# Patient Record
Sex: FEMALE | URBAN - METROPOLITAN AREA
[De-identification: names, ages, dates, MRNs, and addresses within clinical notes are randomized per-mention and may not be internally consistent; named-entity substitution may affect disease eponyms.]

---

## 2017-07-16 ENCOUNTER — EMERGENCY (EMERGENCY)
Facility: HOSPITAL | Age: 22
LOS: 0 days | Discharge: HOME | End: 2017-07-17
Attending: EMERGENCY MEDICINE
Payer: MEDICAID

## 2017-07-16 VITALS
HEIGHT: 65 IN | WEIGHT: 160.06 LBS | DIASTOLIC BLOOD PRESSURE: 65 MMHG | SYSTOLIC BLOOD PRESSURE: 123 MMHG | OXYGEN SATURATION: 100 % | TEMPERATURE: 99 F | HEART RATE: 60 BPM

## 2017-07-16 PROCEDURE — 99282 EMERGENCY DEPT VISIT SF MDM: CPT

## 2017-07-16 NOTE — ED ADULT TRIAGE NOTE - CHIEF COMPLAINT QUOTE
labia swollen since yesterday after having sex. pt believes she may have been allergic to the condom.

## 2017-07-17 RX ORDER — CEPHALEXIN 500 MG
1 CAPSULE ORAL
Qty: 40 | Refills: 0 | OUTPATIENT
Start: 2017-07-17 | End: 2017-07-27

## 2017-07-17 NOTE — ED PROVIDER NOTE - PRESENTING SYMPTOMS DETAILS
21F no pmhx/shx cmoes in w R labia irritation after scratching it since yesterday. states she has eczema and feel like she gets pruritis around her labia occasionally. since then pt has abrasions and feels like her R labia is swollen and possibly infx, denies vag discharge/vb, no obscess felt  ROS: No fever/chills, No photophobia/eye pain/changes in vision, No ear pain/sore throat/dysphagia, No chest pain/palpitations, no SOB/cough/wheeze/stridor, No abdominal pain/N/V/D, no dysuria/frequency/discharge, No neck/back pain, no rash, no changes in neurological status/function.

## 2017-07-17 NOTE — ED PROVIDER NOTE - MEDICAL DECISION MAKING DETAILS
labia appears irritated from scratching, will tx for possible infx in the area, no abscess felt. pt appears well and non-toxic. . VSS. Sx have improved during ED stay. No acute events during ED observation period. Precautions given to return to the ED if sx persist or change. Pt expresses understanding and has no further questions. Pt feels comfortable and wishes to be discharged home. Instructed to follow up with PMD in 24 hrs. gyn

## 2017-07-17 NOTE — ED ADULT NURSE NOTE - OBJECTIVE STATEMENT
Patient co vaginal itching and a bump x1 day. States she had intercourse yesterday with condom. Denies discharge

## 2017-07-17 NOTE — ED PROVIDER NOTE - PHYSICAL EXAMINATION
GEN: Alert, NAD  HEAD: atraumatic, EOMI, PERRL, normal lids/conjunctiva  ENT: normal hearing, patent oropharynx without erythema/exudate, uvula midline  NECK: +supple, no tenderness/meningismus, +Trachea midline  PULM: Bilateral BS, normal resp effort, no wheeze/stridor/retractions  CV: RRR, no M/R/G, +dist ext pulses  ABD: soft, NT/ND  BACK: no CVAT, no midline tenderness  MSK: no edema/erythema/cyanosis  SKIN: no rash  NEURO: AAOx3, no sensory/motor deficits, CN 2-12 intact  R labia mildly swollen w excoriations from scratching, no obvious abscess seen

## 2017-07-18 DIAGNOSIS — N89.8 OTHER SPECIFIED NONINFLAMMATORY DISORDERS OF VAGINA: ICD-10-CM

## 2017-07-18 DIAGNOSIS — N76.2 ACUTE VULVITIS: ICD-10-CM

## 2017-07-18 DIAGNOSIS — L30.9 DERMATITIS, UNSPECIFIED: ICD-10-CM

## 2017-07-22 ENCOUNTER — EMERGENCY (EMERGENCY)
Facility: HOSPITAL | Age: 22
LOS: 0 days | Discharge: HOME | End: 2017-07-22
Attending: STUDENT IN AN ORGANIZED HEALTH CARE EDUCATION/TRAINING PROGRAM
Payer: MEDICAID

## 2017-07-22 VITALS
HEIGHT: 65 IN | SYSTOLIC BLOOD PRESSURE: 126 MMHG | DIASTOLIC BLOOD PRESSURE: 67 MMHG | WEIGHT: 156.09 LBS | HEART RATE: 78 BPM | RESPIRATION RATE: 18 BRPM | TEMPERATURE: 98 F | OXYGEN SATURATION: 100 %

## 2017-07-22 VITALS
RESPIRATION RATE: 18 BRPM | TEMPERATURE: 98 F | SYSTOLIC BLOOD PRESSURE: 118 MMHG | HEART RATE: 61 BPM | DIASTOLIC BLOOD PRESSURE: 68 MMHG | OXYGEN SATURATION: 100 %

## 2017-07-22 DIAGNOSIS — R10.2 PELVIC AND PERINEAL PAIN: ICD-10-CM

## 2017-07-22 DIAGNOSIS — N39.0 URINARY TRACT INFECTION, SITE NOT SPECIFIED: ICD-10-CM

## 2017-07-22 DIAGNOSIS — B37.3 CANDIDIASIS OF VULVA AND VAGINA: ICD-10-CM

## 2017-07-22 DIAGNOSIS — J45.909 UNSPECIFIED ASTHMA, UNCOMPLICATED: ICD-10-CM

## 2017-07-22 LAB
ALBUMIN SERPL ELPH-MCNC: 3.8 G/DL — SIGNIFICANT CHANGE UP (ref 3.3–5)
ALP SERPL-CCNC: 65 U/L — SIGNIFICANT CHANGE UP (ref 40–120)
ALT FLD-CCNC: 17 U/L — SIGNIFICANT CHANGE UP (ref 12–78)
ANION GAP SERPL CALC-SCNC: 8 MMOL/L — SIGNIFICANT CHANGE UP (ref 5–17)
AST SERPL-CCNC: 17 U/L — SIGNIFICANT CHANGE UP (ref 15–37)
BASOPHILS # BLD AUTO: 0 K/UL — SIGNIFICANT CHANGE UP (ref 0–0.2)
BASOPHILS NFR BLD AUTO: 0.8 % — SIGNIFICANT CHANGE UP (ref 0–2)
BILIRUB SERPL-MCNC: 0.3 MG/DL — SIGNIFICANT CHANGE UP (ref 0.2–1.2)
BUN SERPL-MCNC: 12 MG/DL — SIGNIFICANT CHANGE UP (ref 7–23)
CALCIUM SERPL-MCNC: 9.2 MG/DL — SIGNIFICANT CHANGE UP (ref 8.5–10.1)
CHLORIDE SERPL-SCNC: 110 MMOL/L — HIGH (ref 96–108)
CO2 SERPL-SCNC: 27 MMOL/L — SIGNIFICANT CHANGE UP (ref 22–31)
CREAT SERPL-MCNC: 0.51 MG/DL — SIGNIFICANT CHANGE UP (ref 0.5–1.3)
EOSINOPHIL # BLD AUTO: 0.1 K/UL — SIGNIFICANT CHANGE UP (ref 0–0.5)
EOSINOPHIL NFR BLD AUTO: 2 % — SIGNIFICANT CHANGE UP (ref 0–6)
GLUCOSE SERPL-MCNC: 83 MG/DL — SIGNIFICANT CHANGE UP (ref 70–99)
HBA1C BLD-MCNC: 5.2 % — SIGNIFICANT CHANGE UP (ref 4–5.6)
HCT VFR BLD CALC: 38.5 % — SIGNIFICANT CHANGE UP (ref 34.5–45)
HGB BLD-MCNC: 12.5 G/DL — SIGNIFICANT CHANGE UP (ref 11.5–15.5)
LYMPHOCYTES # BLD AUTO: 2 K/UL — SIGNIFICANT CHANGE UP (ref 1–3.3)
LYMPHOCYTES # BLD AUTO: 41.8 % — SIGNIFICANT CHANGE UP (ref 13–44)
MCHC RBC-ENTMCNC: 29.3 PG — SIGNIFICANT CHANGE UP (ref 27–34)
MCHC RBC-ENTMCNC: 32.6 GM/DL — SIGNIFICANT CHANGE UP (ref 32–36)
MCV RBC AUTO: 90 FL — SIGNIFICANT CHANGE UP (ref 80–100)
MONOCYTES # BLD AUTO: 0.4 K/UL — SIGNIFICANT CHANGE UP (ref 0–0.9)
MONOCYTES NFR BLD AUTO: 7.9 % — SIGNIFICANT CHANGE UP (ref 2–14)
NEUTROPHILS # BLD AUTO: 2.3 K/UL — SIGNIFICANT CHANGE UP (ref 1.8–7.4)
NEUTROPHILS NFR BLD AUTO: 47.5 % — SIGNIFICANT CHANGE UP (ref 43–77)
PLATELET # BLD AUTO: 235 K/UL — SIGNIFICANT CHANGE UP (ref 150–400)
POTASSIUM SERPL-MCNC: 4 MMOL/L — SIGNIFICANT CHANGE UP (ref 3.5–5.3)
POTASSIUM SERPL-SCNC: 4 MMOL/L — SIGNIFICANT CHANGE UP (ref 3.5–5.3)
PROT SERPL-MCNC: 8 GM/DL — SIGNIFICANT CHANGE UP (ref 6–8.3)
RBC # BLD: 4.28 M/UL — SIGNIFICANT CHANGE UP (ref 3.8–5.2)
RBC # FLD: 10.7 % — LOW (ref 11–15)
SODIUM SERPL-SCNC: 145 MMOL/L — SIGNIFICANT CHANGE UP (ref 135–145)
WBC # BLD: 4.8 K/UL — SIGNIFICANT CHANGE UP (ref 3.8–10.5)
WBC # FLD AUTO: 4.8 K/UL — SIGNIFICANT CHANGE UP (ref 3.8–10.5)

## 2017-07-22 PROCEDURE — 99284 EMERGENCY DEPT VISIT MOD MDM: CPT | Mod: 25

## 2017-07-22 RX ORDER — FLUCONAZOLE 150 MG/1
1 TABLET ORAL
Qty: 10 | Refills: 0 | OUTPATIENT
Start: 2017-07-22 | End: 2017-08-01

## 2017-07-22 RX ORDER — MORPHINE SULFATE 50 MG/1
2 CAPSULE, EXTENDED RELEASE ORAL ONCE
Qty: 0 | Refills: 0 | Status: DISCONTINUED | OUTPATIENT
Start: 2017-07-22 | End: 2017-07-22

## 2017-07-22 RX ORDER — ONDANSETRON 8 MG/1
4 TABLET, FILM COATED ORAL ONCE
Qty: 0 | Refills: 0 | Status: COMPLETED | OUTPATIENT
Start: 2017-07-22 | End: 2017-07-22

## 2017-07-22 RX ORDER — FLUCONAZOLE 150 MG/1
150 TABLET ORAL ONCE
Qty: 0 | Refills: 0 | Status: COMPLETED | OUTPATIENT
Start: 2017-07-22 | End: 2017-07-22

## 2017-07-22 RX ORDER — LIDOCAINE HCL 20 MG/ML
15 VIAL (ML) INJECTION ONCE
Qty: 0 | Refills: 0 | Status: COMPLETED | OUTPATIENT
Start: 2017-07-22 | End: 2017-07-22

## 2017-07-22 RX ORDER — AMPICILLIN SODIUM AND SULBACTAM SODIUM 250; 125 MG/ML; MG/ML
3 INJECTION, POWDER, FOR SUSPENSION INTRAMUSCULAR; INTRAVENOUS ONCE
Qty: 0 | Refills: 0 | Status: COMPLETED | OUTPATIENT
Start: 2017-07-22 | End: 2017-07-22

## 2017-07-22 RX ADMIN — Medication 15 MILLILITER(S): at 10:34

## 2017-07-22 RX ADMIN — FLUCONAZOLE 150 MILLIGRAM(S): 150 TABLET ORAL at 12:53

## 2017-07-22 RX ADMIN — MORPHINE SULFATE 2 MILLIGRAM(S): 50 CAPSULE, EXTENDED RELEASE ORAL at 09:34

## 2017-07-22 RX ADMIN — ONDANSETRON 4 MILLIGRAM(S): 8 TABLET, FILM COATED ORAL at 08:35

## 2017-07-22 RX ADMIN — MORPHINE SULFATE 2 MILLIGRAM(S): 50 CAPSULE, EXTENDED RELEASE ORAL at 08:35

## 2017-07-22 RX ADMIN — AMPICILLIN SODIUM AND SULBACTAM SODIUM 200 GRAM(S): 250; 125 INJECTION, POWDER, FOR SUSPENSION INTRAMUSCULAR; INTRAVENOUS at 12:53

## 2017-07-22 NOTE — ED ADULT TRIAGE NOTE - CHIEF COMPLAINT QUOTE
c/o frequent yeast infection, scratch labia , labia looks infected c/o frequent yeast infection, scratch labia , labia looks infected, pt is breastfeeding c/o frequent yeast infection, scratch labia , labia looks infected, pt is breastfeeding, feel urinary retention do to pain

## 2017-07-22 NOTE — ED PROVIDER NOTE - PROGRESS NOTE DETAILS
dr judd consulted, states that pt can be discharged with rose if reliable, pt can also follow up in his office to have rose removed, recommends diflucan po qd x 10 days, also wants a hga1c and hsv type 2 ordered

## 2017-07-22 NOTE — ED ADULT NURSE NOTE - OBJECTIVE STATEMENT
pt seen in ED for uti, and labia irritation on sunday, given antibiotics. pt unable to urinate x 2 days due to pain

## 2017-07-22 NOTE — ED PROVIDER NOTE - OBJECTIVE STATEMENT
21 year old female with chronic h/o vaginal candidiasis and asthma presents today c/o worsening vaginal irritation, pt was seen at [facility] on Sunday and started on keflex for her irritation, since then she did scratch the area and the area worsened, now swollen (more on the right labia) +white discharge, +severe tenderness (-) fevers (-) nausea or vomiting, pt has seen her gyn who has been treating her with multiple medications, it does improve but then worsens

## 2017-07-22 NOTE — ED PROVIDER NOTE - MEDICAL DECISION MAKING DETAILS
labs, rose placed, iv antibiotics for secondary infection and diflucan, ob consulted and recommend to start diflucan QD x10 days, also want hga1c and hsv type 2 ordered , pt can follow up in his office to have rose removed on monday

## 2017-07-23 LAB
HSV2 IGG FLD-ACNC: 3.53 INDEX — HIGH
HSV2 IGG SERPL QL IA: POSITIVE

## 2017-08-02 ENCOUNTER — EMERGENCY (EMERGENCY)
Facility: HOSPITAL | Age: 22
LOS: 0 days | Discharge: HOME | End: 2017-08-02
Attending: EMERGENCY MEDICINE
Payer: MEDICAID

## 2017-08-02 VITALS
OXYGEN SATURATION: 99 % | WEIGHT: 145.95 LBS | RESPIRATION RATE: 16 BRPM | TEMPERATURE: 99 F | SYSTOLIC BLOOD PRESSURE: 121 MMHG | DIASTOLIC BLOOD PRESSURE: 71 MMHG | HEART RATE: 60 BPM | HEIGHT: 65 IN

## 2017-08-02 LAB
APPEARANCE UR: ABNORMAL
BACTERIA # UR AUTO: ABNORMAL
BILIRUB UR-MCNC: NEGATIVE — SIGNIFICANT CHANGE UP
COLOR SPEC: YELLOW — SIGNIFICANT CHANGE UP
DIFF PNL FLD: ABNORMAL
EPI CELLS # UR: ABNORMAL
GLUCOSE UR QL: NEGATIVE MG/DL — SIGNIFICANT CHANGE UP
HCG UR QL: NEGATIVE — SIGNIFICANT CHANGE UP
KETONES UR-MCNC: NEGATIVE — SIGNIFICANT CHANGE UP
LEUKOCYTE ESTERASE UR-ACNC: ABNORMAL
NITRITE UR-MCNC: POSITIVE
PH UR: 8 — SIGNIFICANT CHANGE UP (ref 5–8)
PROT UR-MCNC: 30 MG/DL
RBC CASTS # UR COMP ASSIST: ABNORMAL /HPF (ref 0–4)
SP GR SPEC: 1.01 — SIGNIFICANT CHANGE UP (ref 1.01–1.02)
UROBILINOGEN FLD QL: NEGATIVE MG/DL — SIGNIFICANT CHANGE UP
WBC UR QL: ABNORMAL

## 2017-08-02 PROCEDURE — 99283 EMERGENCY DEPT VISIT LOW MDM: CPT

## 2017-08-02 RX ORDER — ACYCLOVIR SODIUM 500 MG
200 VIAL (EA) INTRAVENOUS ONCE
Qty: 0 | Refills: 0 | Status: DISCONTINUED | OUTPATIENT
Start: 2017-08-02 | End: 2017-08-02

## 2017-08-02 RX ORDER — ACYCLOVIR SODIUM 500 MG
400 VIAL (EA) INTRAVENOUS ONCE
Qty: 0 | Refills: 0 | Status: COMPLETED | OUTPATIENT
Start: 2017-08-02 | End: 2017-08-02

## 2017-08-02 RX ORDER — NITROFURANTOIN MACROCRYSTAL 50 MG
100 CAPSULE ORAL ONCE
Qty: 0 | Refills: 0 | Status: COMPLETED | OUTPATIENT
Start: 2017-08-02 | End: 2017-08-02

## 2017-08-02 RX ORDER — ACYCLOVIR SODIUM 500 MG
1 VIAL (EA) INTRAVENOUS
Qty: 21 | Refills: 0
Start: 2017-08-02 | End: 2017-08-09

## 2017-08-02 RX ORDER — NITROFURANTOIN MACROCRYSTAL 50 MG
1 CAPSULE ORAL
Qty: 14 | Refills: 0 | OUTPATIENT
Start: 2017-08-02 | End: 2017-08-09

## 2017-08-02 RX ADMIN — Medication 400 MILLIGRAM(S): at 19:31

## 2017-08-02 RX ADMIN — Medication 100 MILLIGRAM(S): at 19:31

## 2017-08-02 NOTE — ED ADULT NURSE NOTE - OBJECTIVE STATEMENT
received on stretcher, alert and oriented x4. Patient states that she is here to have her rose catheter removed as she missed the GYN appointment as scheduled at her doctors office.

## 2017-08-02 NOTE — ED PROVIDER NOTE - OBJECTIVE STATEMENT
22 yo female presents requesting rose removal. Patient states rose was placed two weeks ago in this facility secondary to inability to urinate. Patient states that her labia was irritated. Patient denies vaginal discharge, lesions.

## 2017-08-02 NOTE — ED PROVIDER NOTE - CARE PLAN
Principal Discharge DX:	UTI (urinary tract infection)  Secondary Diagnosis:	Balbuena catheter problem, subsequent encounter

## 2017-08-04 DIAGNOSIS — R39.198 OTHER DIFFICULTIES WITH MICTURITION: ICD-10-CM

## 2017-08-04 DIAGNOSIS — N39.0 URINARY TRACT INFECTION, SITE NOT SPECIFIED: ICD-10-CM

## 2017-08-04 DIAGNOSIS — T83.9XXD UNSPECIFIED COMPLICATION OF GENITOURINARY PROSTHETIC DEVICE, IMPLANT AND GRAFT, SUBSEQUENT ENCOUNTER: ICD-10-CM
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
## 2017-08-07 RX ORDER — AZTREONAM 2 G
1 VIAL (EA) INJECTION
Qty: 14 | Refills: 0 | OUTPATIENT
Start: 2017-08-07 | End: 2017-08-14

## 2018-02-28 ENCOUNTER — OUTPATIENT (OUTPATIENT)
Dept: OUTPATIENT SERVICES | Facility: HOSPITAL | Age: 23
LOS: 1 days | End: 2018-02-28

## 2018-02-28 DIAGNOSIS — O26.90 PREGNANCY RELATED CONDITIONS, UNSPECIFIED, UNSPECIFIED TRIMESTER: ICD-10-CM

## 2018-02-28 DIAGNOSIS — Z3A.00 WEEKS OF GESTATION OF PREGNANCY NOT SPECIFIED: ICD-10-CM

## 2018-03-01 ENCOUNTER — LABORATORY RESULT (OUTPATIENT)
Age: 23
End: 2018-03-01

## 2018-03-01 ENCOUNTER — OUTPATIENT (OUTPATIENT)
Dept: OUTPATIENT SERVICES | Facility: HOSPITAL | Age: 23
LOS: 1 days | End: 2018-03-01

## 2018-03-01 ENCOUNTER — APPOINTMENT (OUTPATIENT)
Dept: OBGYN | Facility: HOSPITAL | Age: 23
End: 2018-03-01
Payer: MEDICAID

## 2018-03-01 VITALS
BODY MASS INDEX: 26.66 KG/M2 | SYSTOLIC BLOOD PRESSURE: 106 MMHG | HEIGHT: 65 IN | DIASTOLIC BLOOD PRESSURE: 62 MMHG | WEIGHT: 160 LBS | HEART RATE: 79 BPM

## 2018-03-01 DIAGNOSIS — Z78.9 OTHER SPECIFIED HEALTH STATUS: ICD-10-CM

## 2018-03-01 LAB
AMPHET UR-MCNC: NEGATIVE — SIGNIFICANT CHANGE UP
BARBITURATES UR SCN-MCNC: NEGATIVE — SIGNIFICANT CHANGE UP
BASOPHILS # BLD AUTO: 0.03 K/UL — SIGNIFICANT CHANGE UP (ref 0–0.2)
BASOPHILS NFR BLD AUTO: 0.4 % — SIGNIFICANT CHANGE UP (ref 0–2)
BENZODIAZ UR-MCNC: NEGATIVE — SIGNIFICANT CHANGE UP
BLD GP AB SCN SERPL QL: NEGATIVE — SIGNIFICANT CHANGE UP
CANNABINOIDS UR-MCNC: NEGATIVE — SIGNIFICANT CHANGE UP
COCAINE METAB.OTHER UR-MCNC: NEGATIVE — SIGNIFICANT CHANGE UP
EOSINOPHIL # BLD AUTO: 0.29 K/UL — SIGNIFICANT CHANGE UP (ref 0–0.5)
EOSINOPHIL NFR BLD AUTO: 4 % — SIGNIFICANT CHANGE UP (ref 0–6)
HCT VFR BLD CALC: 35.5 % — SIGNIFICANT CHANGE UP (ref 34.5–45)
HGB BLD-MCNC: 11.9 G/DL — SIGNIFICANT CHANGE UP (ref 11.5–15.5)
IMM GRANULOCYTES # BLD AUTO: 0.04 # — SIGNIFICANT CHANGE UP
IMM GRANULOCYTES NFR BLD AUTO: 0.6 % — SIGNIFICANT CHANGE UP (ref 0–1.5)
LYMPHOCYTES # BLD AUTO: 2.5 K/UL — SIGNIFICANT CHANGE UP (ref 1–3.3)
LYMPHOCYTES # BLD AUTO: 34.9 % — SIGNIFICANT CHANGE UP (ref 13–44)
MCHC RBC-ENTMCNC: 30 PG — SIGNIFICANT CHANGE UP (ref 27–34)
MCHC RBC-ENTMCNC: 33.5 % — SIGNIFICANT CHANGE UP (ref 32–36)
MCV RBC AUTO: 89.4 FL — SIGNIFICANT CHANGE UP (ref 80–100)
METHADONE UR-MCNC: NEGATIVE — SIGNIFICANT CHANGE UP
MONOCYTES # BLD AUTO: 0.67 K/UL — SIGNIFICANT CHANGE UP (ref 0–0.9)
MONOCYTES NFR BLD AUTO: 9.3 % — SIGNIFICANT CHANGE UP (ref 2–14)
NEUTROPHILS # BLD AUTO: 3.64 K/UL — SIGNIFICANT CHANGE UP (ref 1.8–7.4)
NEUTROPHILS NFR BLD AUTO: 50.8 % — SIGNIFICANT CHANGE UP (ref 43–77)
NRBC # FLD: 0 — SIGNIFICANT CHANGE UP
OPIATES UR-MCNC: NEGATIVE — SIGNIFICANT CHANGE UP
OXYCODONE UR-MCNC: NEGATIVE — SIGNIFICANT CHANGE UP
PCP UR-MCNC: NEGATIVE — SIGNIFICANT CHANGE UP
PLATELET # BLD AUTO: 196 K/UL — SIGNIFICANT CHANGE UP (ref 150–400)
PMV BLD: 11.4 FL — SIGNIFICANT CHANGE UP (ref 7–13)
RBC # BLD: 3.97 M/UL — SIGNIFICANT CHANGE UP (ref 3.8–5.2)
RBC # FLD: 12.4 % — SIGNIFICANT CHANGE UP (ref 10.3–14.5)
RH IG SCN BLD-IMP: POSITIVE — SIGNIFICANT CHANGE UP
WBC # BLD: 7.17 K/UL — SIGNIFICANT CHANGE UP (ref 3.8–10.5)
WBC # FLD AUTO: 7.17 K/UL — SIGNIFICANT CHANGE UP (ref 3.8–10.5)

## 2018-03-01 PROCEDURE — 99213 OFFICE O/P EST LOW 20 MIN: CPT

## 2018-03-02 PROBLEM — Z78.9 DENIES ALCOHOL CONSUMPTION: Status: ACTIVE | Noted: 2018-03-02

## 2018-03-02 LAB
C TRACH RRNA SPEC QL NAA+PROBE: SIGNIFICANT CHANGE UP
HBV SURFACE AG SER-ACNC: NONREACTIVE — SIGNIFICANT CHANGE UP
HCV AB S/CO SERPL IA: 0.12 S/CO — SIGNIFICANT CHANGE UP
HCV AB SERPL-IMP: SIGNIFICANT CHANGE UP
HIV 1+2 AB+HIV1 P24 AG SERPL QL IA: SIGNIFICANT CHANGE UP
N GONORRHOEA RRNA SPEC QL NAA+PROBE: SIGNIFICANT CHANGE UP
RBC # BLD FETUS AUTO: 0.1 — SIGNIFICANT CHANGE UP
SPECIMEN SOURCE: SIGNIFICANT CHANGE UP
T GONDII IGG SER QL: <3 IU/ML — SIGNIFICANT CHANGE UP
T GONDII IGG SER QL: NEGATIVE — SIGNIFICANT CHANGE UP
T GONDII IGM SER QL: <3 AU/ML — SIGNIFICANT CHANGE UP
T GONDII IGM SER QL: NEGATIVE — SIGNIFICANT CHANGE UP
T PALLIDUM AB TITR SER: NEGATIVE — SIGNIFICANT CHANGE UP

## 2018-03-02 RX ORDER — VITAMIN C, CALCIUM, IRON, VITAMIN D3, VITAMIN E, VITAMIN B1, VITAMIN B2, VITAMIN B3, VITAMIN B6, FOLIC ACID, IODINE, ZINC, COPPER, DOCUSATE SODIUM, DOCOSAHEXAENOIC ACID (DHA) 27-1-50 MG
KIT ORAL
Refills: 0 | Status: ACTIVE | COMMUNITY

## 2018-03-02 RX ORDER — DOXYCYCLINE HYCLATE 100 MG/1
100 TABLET ORAL
Qty: 10 | Refills: 0 | Status: ACTIVE | COMMUNITY
Start: 2018-03-02 | End: 1900-01-01

## 2018-03-04 ENCOUNTER — APPOINTMENT (OUTPATIENT)
Dept: ANTEPARTUM | Facility: HOSPITAL | Age: 23
End: 2018-03-04

## 2018-03-04 ENCOUNTER — ASOB RESULT (OUTPATIENT)
Age: 23
End: 2018-03-04

## 2018-03-04 ENCOUNTER — OUTPATIENT (OUTPATIENT)
Dept: OUTPATIENT SERVICES | Facility: HOSPITAL | Age: 23
LOS: 1 days | End: 2018-03-04

## 2018-03-04 DIAGNOSIS — O26.899 OTHER SPECIFIED PREGNANCY RELATED CONDITIONS, UNSPECIFIED TRIMESTER: ICD-10-CM

## 2018-03-04 DIAGNOSIS — Z3A.00 WEEKS OF GESTATION OF PREGNANCY NOT SPECIFIED: ICD-10-CM

## 2018-03-05 ENCOUNTER — RESULT REVIEW (OUTPATIENT)
Age: 23
End: 2018-03-05

## 2018-03-05 ENCOUNTER — APPOINTMENT (OUTPATIENT)
Dept: ANTEPARTUM | Facility: CLINIC | Age: 23
End: 2018-03-05
Payer: MEDICAID

## 2018-03-05 ENCOUNTER — OUTPATIENT (OUTPATIENT)
Dept: OUTPATIENT SERVICES | Facility: HOSPITAL | Age: 23
LOS: 1 days | End: 2018-03-05

## 2018-03-05 ENCOUNTER — ASOB RESULT (OUTPATIENT)
Age: 23
End: 2018-03-05

## 2018-03-05 PROCEDURE — 76815 OB US LIMITED FETUS(S): CPT | Mod: 26

## 2018-03-05 PROCEDURE — 59200 INSERT CERVICAL DILATOR: CPT

## 2018-03-05 RX ORDER — MISOPROSTOL 200 UG/1
200 TABLET ORAL
Qty: 2 | Refills: 0 | Status: ACTIVE | COMMUNITY
Start: 2018-03-05 | End: 1900-01-01

## 2018-03-06 ENCOUNTER — INPATIENT (INPATIENT)
Facility: HOSPITAL | Age: 23
LOS: 2 days | Discharge: HOME | End: 2018-03-09
Attending: OBSTETRICS & GYNECOLOGY | Admitting: OBSTETRICS & GYNECOLOGY

## 2018-03-06 ENCOUNTER — TRANSCRIPTION ENCOUNTER (OUTPATIENT)
Age: 23
End: 2018-03-06

## 2018-03-06 ENCOUNTER — EMERGENCY (EMERGENCY)
Facility: HOSPITAL | Age: 23
LOS: 1 days | Discharge: STILL ADMITTED OR ADMINISTRATIVE | End: 2018-03-06
Admitting: EMERGENCY MEDICINE

## 2018-03-06 VITALS
TEMPERATURE: 98 F | SYSTOLIC BLOOD PRESSURE: 120 MMHG | RESPIRATION RATE: 16 BRPM | OXYGEN SATURATION: 100 % | DIASTOLIC BLOOD PRESSURE: 84 MMHG | HEART RATE: 95 BPM

## 2018-03-06 VITALS — HEART RATE: 75 BPM | TEMPERATURE: 99 F | DIASTOLIC BLOOD PRESSURE: 55 MMHG | SYSTOLIC BLOOD PRESSURE: 110 MMHG

## 2018-03-06 VITALS — HEART RATE: 65 BPM | TEMPERATURE: 97 F

## 2018-03-06 DIAGNOSIS — O26.899 OTHER SPECIFIED PREGNANCY RELATED CONDITIONS, UNSPECIFIED TRIMESTER: ICD-10-CM

## 2018-03-06 DIAGNOSIS — Z3A.00 WEEKS OF GESTATION OF PREGNANCY NOT SPECIFIED: ICD-10-CM

## 2018-03-06 DIAGNOSIS — O36.4XX0 MATERNAL CARE FOR INTRAUTERINE DEATH, NOT APPLICABLE OR UNSPECIFIED: ICD-10-CM

## 2018-03-06 LAB
APTT BLD: 28.9 SEC — SIGNIFICANT CHANGE UP (ref 27.5–37.4)
BASOPHILS # BLD AUTO: 0.03 K/UL — SIGNIFICANT CHANGE UP (ref 0–0.2)
BASOPHILS NFR BLD AUTO: 0.3 % — SIGNIFICANT CHANGE UP (ref 0–2)
BLD GP AB SCN SERPL QL: NEGATIVE — SIGNIFICANT CHANGE UP
EOSINOPHIL # BLD AUTO: 0.34 K/UL — SIGNIFICANT CHANGE UP (ref 0–0.5)
EOSINOPHIL NFR BLD AUTO: 3.9 % — SIGNIFICANT CHANGE UP (ref 0–6)
FIBRINOGEN PPP-MCNC: 439.2 MG/DL — SIGNIFICANT CHANGE UP (ref 310–510)
HCT VFR BLD CALC: 35.5 % — SIGNIFICANT CHANGE UP (ref 34.5–45)
HGB BLD-MCNC: 12 G/DL — SIGNIFICANT CHANGE UP (ref 11.5–15.5)
IMM GRANULOCYTES # BLD AUTO: 0.04 # — SIGNIFICANT CHANGE UP
IMM GRANULOCYTES NFR BLD AUTO: 0.5 % — SIGNIFICANT CHANGE UP (ref 0–1.5)
INR BLD: 0.97 — SIGNIFICANT CHANGE UP (ref 0.88–1.17)
LYMPHOCYTES # BLD AUTO: 3.31 K/UL — HIGH (ref 1–3.3)
LYMPHOCYTES # BLD AUTO: 38.4 % — SIGNIFICANT CHANGE UP (ref 13–44)
MCHC RBC-ENTMCNC: 29.8 PG — SIGNIFICANT CHANGE UP (ref 27–34)
MCHC RBC-ENTMCNC: 33.8 % — SIGNIFICANT CHANGE UP (ref 32–36)
MCV RBC AUTO: 88.1 FL — SIGNIFICANT CHANGE UP (ref 80–100)
MONOCYTES # BLD AUTO: 0.72 K/UL — SIGNIFICANT CHANGE UP (ref 0–0.9)
MONOCYTES NFR BLD AUTO: 8.4 % — SIGNIFICANT CHANGE UP (ref 2–14)
NEUTROPHILS # BLD AUTO: 4.18 K/UL — SIGNIFICANT CHANGE UP (ref 1.8–7.4)
NEUTROPHILS NFR BLD AUTO: 48.5 % — SIGNIFICANT CHANGE UP (ref 43–77)
NRBC # FLD: 0 — SIGNIFICANT CHANGE UP
PLATELET # BLD AUTO: 173 K/UL — SIGNIFICANT CHANGE UP (ref 150–400)
PMV BLD: 10.9 FL — SIGNIFICANT CHANGE UP (ref 7–13)
PROTHROM AB SERPL-ACNC: 11.2 SEC — SIGNIFICANT CHANGE UP (ref 9.8–13.1)
RBC # BLD: 4.03 M/UL — SIGNIFICANT CHANGE UP (ref 3.8–5.2)
RBC # FLD: 12.2 % — SIGNIFICANT CHANGE UP (ref 10.3–14.5)
RH IG SCN BLD-IMP: POSITIVE — SIGNIFICANT CHANGE UP
WBC # BLD: 8.62 K/UL — SIGNIFICANT CHANGE UP (ref 3.8–10.5)
WBC # FLD AUTO: 8.62 K/UL — SIGNIFICANT CHANGE UP (ref 3.8–10.5)

## 2018-03-06 RX ORDER — SODIUM CHLORIDE 9 MG/ML
1000 INJECTION, SOLUTION INTRAVENOUS
Qty: 0 | Refills: 0 | Status: DISCONTINUED | OUTPATIENT
Start: 2018-03-06 | End: 2018-03-06

## 2018-03-06 RX ORDER — KETOROLAC TROMETHAMINE 30 MG/ML
30 SYRINGE (ML) INJECTION ONCE
Qty: 0 | Refills: 0 | Status: DISCONTINUED | OUTPATIENT
Start: 2018-03-06 | End: 2018-03-06

## 2018-03-06 RX ORDER — OXYTOCIN 10 UNIT/ML
10 VIAL (ML) INJECTION ONCE
Qty: 0 | Refills: 0 | Status: COMPLETED | OUTPATIENT
Start: 2018-03-06 | End: 2018-03-06

## 2018-03-06 RX ORDER — ACETAMINOPHEN 500 MG
975 TABLET ORAL EVERY 6 HOURS
Qty: 0 | Refills: 0 | Status: DISCONTINUED | OUTPATIENT
Start: 2018-03-06 | End: 2018-03-06

## 2018-03-06 RX ORDER — MORPHINE SULFATE 50 MG/1
4 CAPSULE, EXTENDED RELEASE ORAL EVERY 6 HOURS
Qty: 0 | Refills: 0 | Status: DISCONTINUED | OUTPATIENT
Start: 2018-03-06 | End: 2018-03-06

## 2018-03-06 RX ORDER — CARBOPROST TROMETHAMINE 250 UG/ML
250 INJECTION, SOLUTION INTRAMUSCULAR ONCE
Qty: 0 | Refills: 0 | Status: DISCONTINUED | OUTPATIENT
Start: 2018-03-06 | End: 2018-03-06

## 2018-03-06 RX ORDER — INFLUENZA VIRUS VACCINE 15; 15; 15; 15 UG/.5ML; UG/.5ML; UG/.5ML; UG/.5ML
0.5 SUSPENSION INTRAMUSCULAR ONCE
Qty: 0 | Refills: 0 | Status: DISCONTINUED | OUTPATIENT
Start: 2018-03-06 | End: 2018-03-06

## 2018-03-06 RX ORDER — OXYCODONE HYDROCHLORIDE 5 MG/1
5 TABLET ORAL
Qty: 0 | Refills: 0 | Status: DISCONTINUED | OUTPATIENT
Start: 2018-03-06 | End: 2018-03-06

## 2018-03-06 RX ORDER — DIPHENOXYLATE HCL/ATROPINE 2.5-.025MG
2 TABLET ORAL ONCE
Qty: 0 | Refills: 0 | Status: DISCONTINUED | OUTPATIENT
Start: 2018-03-06 | End: 2018-03-06

## 2018-03-06 RX ORDER — OXYCODONE HYDROCHLORIDE 5 MG/1
5 TABLET ORAL EVERY 4 HOURS
Qty: 0 | Refills: 0 | Status: DISCONTINUED | OUTPATIENT
Start: 2018-03-06 | End: 2018-03-06

## 2018-03-06 RX ORDER — IBUPROFEN 200 MG
600 TABLET ORAL EVERY 6 HOURS
Qty: 0 | Refills: 0 | Status: DISCONTINUED | OUTPATIENT
Start: 2018-03-06 | End: 2018-03-06

## 2018-03-06 RX ADMIN — MORPHINE SULFATE 4 MILLIGRAM(S): 50 CAPSULE, EXTENDED RELEASE ORAL at 02:15

## 2018-03-06 RX ADMIN — Medication 0.2 MILLIGRAM(S): at 09:36

## 2018-03-06 RX ADMIN — MORPHINE SULFATE 4 MILLIGRAM(S): 50 CAPSULE, EXTENDED RELEASE ORAL at 02:30

## 2018-03-06 RX ADMIN — Medication 30 MILLIGRAM(S): at 04:25

## 2018-03-06 RX ADMIN — Medication 30 MILLIGRAM(S): at 04:10

## 2018-03-06 RX ADMIN — Medication 10 UNIT(S): at 03:08

## 2018-03-06 NOTE — DISCHARGE NOTE OB - HOSPITAL COURSE
22yoF  at 22w1d with known IUFD,  spontaneous delivery of fetus and placenta. 22yoF  at 22w1d with known IUFD,  spontaneous delivery of fetus and placenta.  EBL=500.  Additional PPH of 250.  IM methergine given, Pt to be on po methergine every 4 hours for 5 additional doses.

## 2018-03-06 NOTE — DISCHARGE NOTE OB - PLAN OF CARE
recovery After discharge, please stay on pelvic rest for 6 weeks, meaning no sexual intercourse, no tampons and no douching.  No driving for 2 weeks as women can loose a lot of blood during delivery and there is a possibility of being lightheaded/fainting.  Expect to have vaginal bleeding/spotting for up to six weeks.  The bleeding should get lighter and more white/light brown with time.  For bleeding soaking more than a pad an hour or passing clots greater than the size of your fist, come in to the emergency department.

## 2018-03-06 NOTE — CHART NOTE - NSCHARTNOTEFT_GEN_A_CORE
Pt seen for passage of clots. Pt reports no symptoms at this time, no pain.    VS  T(C): 36.7 (03-06-18 @ 07:28)  HR: 67 (03-06-18 @ 07:28)  BP: 111/57 (03-06-18 @ 07:28)  RR: 16 (03-06-18 @ 01:32)  SpO2: 100% (03-06-18 @ 01:32)    Abd: Soft, non-distended, fundus firm  VE: Minimal clots expressed from vaginal vault, cervix 1 cm dilated; roughly 200 mL of blood in hat with membranes present and small clots. No visible placenta tissue.  Abd sono: 5 mm endometrial stripe through most of the uterus, but 2 cm present right at the lower uterine segment    Plan:  - Hemabate 250 mcg IM to express remaining tissue or clot  - continue to monitor    d/w Dr. Shawna Marin, PGY-2

## 2018-03-06 NOTE — DISCHARGE NOTE OB - MEDICATION SUMMARY - MEDICATIONS TO STOP TAKING
I will STOP taking the medications listed below when I get home from the hospital:  None I will STOP taking the medications listed below when I get home from the hospital:    amoxicillin-clavulanate 250 mg-125 mg oral tablet  -- 1 tab(s) by mouth 2 times a day    Keflex 500 mg oral capsule  -- 1 cap(s) by mouth 4 times a day  -- Finish all this medication unless otherwise directed by prescriber.    Diflucan 150 mg oral tablet  -- 1 tab(s) by mouth once a day  -- Do not take this drug if you are pregnant.  Finish all this medication unless otherwise directed by prescriber.    Macrodantin 100 mg oral capsule  -- 1 cap(s) by mouth 2 times a day  -- Finish all this medication unless otherwise directed by prescriber.  May discolor urine or feces.  Take with food or milk.    Bactrim  mg-160 mg oral tablet  -- 1 tab(s) by mouth 2 times a day  -- Avoid prolonged or excessive exposure to direct and/or artificial sunlight while taking this medication.  Finish all this medication unless otherwise directed by prescriber.  Medication should be taken with plenty of water.

## 2018-03-06 NOTE — DISCHARGE NOTE OB - CARE PLAN
Principal Discharge DX:	Fetal demise due to miscarriage  Goal:	recovery  Assessment and plan of treatment:	After discharge, please stay on pelvic rest for 6 weeks, meaning no sexual intercourse, no tampons and no douching.  No driving for 2 weeks as women can loose a lot of blood during delivery and there is a possibility of being lightheaded/fainting.  Expect to have vaginal bleeding/spotting for up to six weeks.  The bleeding should get lighter and more white/light brown with time.  For bleeding soaking more than a pad an hour or passing clots greater than the size of your fist, come in to the emergency department.

## 2018-03-06 NOTE — DISCHARGE NOTE OB - PATIENT PORTAL LINK FT
You can access the [facility] Patient Portal, offered by [facility], by registering with the following website: [email or web address]

## 2018-03-06 NOTE — DISCHARGE NOTE OB - MEDICATION SUMMARY - MEDICATIONS TO TAKE
I will START or STAY ON the medications listed below when I get home from the hospital:    ibuprofen 600 mg oral tablet  -- 1 tab(s) by mouth every 6 hours  -- Do not take this drug if you are pregnant.  It is very important that you take or use this exactly as directed.  Do not skip doses or discontinue unless directed by your doctor.  May cause drowsiness or dizziness.  Obtain medical advice before taking any non-prescription drugs as some may affect the action of this medication.  Take with food or milk.    -- Indication: For pain    acyclovir 400 mg oral tablet  -- 1 tab(s) by mouth 3 times a day  -- Finish all this medication unless otherwise directed by prescriber.  It is very important that you take or use this exactly as directed.  Do not skip doses or discontinue unless directed by your doctor.    -- Indication: For home    Ventolin CFC free 90 mcg/inh inhalation aerosol  -- 2 puff(s) inhaled 4 times a day  -- Indication: For home I will START or STAY ON the medications listed below when I get home from the hospital:    ibuprofen 600 mg oral tablet  -- 1 tab(s) by mouth every 6 hours  -- Do not take this drug if you are pregnant.  It is very important that you take or use this exactly as directed.  Do not skip doses or discontinue unless directed by your doctor.  May cause drowsiness or dizziness.  Obtain medical advice before taking any non-prescription drugs as some may affect the action of this medication.  Take with food or milk.    -- Indication: For pain    acyclovir 400 mg oral tablet  -- 1 tab(s) by mouth 3 times a day  -- Finish all this medication unless otherwise directed by prescriber.  It is very important that you take or use this exactly as directed.  Do not skip doses or discontinue unless directed by your doctor.    -- Indication: For home    Ventolin CFC free 90 mcg/inh inhalation aerosol  -- 2 puff(s) inhaled 4 times a day  -- Indication: For home    Methergine 0.2 mg oral tablet  -- 1 tab(s) by mouth every 4 hours x 1 days   -- It is very important that you take or use this exactly as directed.  Do not skip doses or discontinue unless directed by your doctor.    -- Indication: For bleeding

## 2018-03-06 NOTE — ED ADULT TRIAGE NOTE - CHIEF COMPLAINT QUOTE
pt brought in by ems, pt 22 weeks pregnant, diagnosed with miscarraige last week. scheduled for fetus removal today, c.o contractions every 3 minutes. denies vaginal bleeding. pt brought directly to labor and delivery.

## 2018-03-06 NOTE — DISCHARGE NOTE OB - CARE PROVIDER_API CALL
OBGYN clinic,   [facility], 3rd floor oncology building/ambulatory clinic  Phone: [phone]  Fax: (       -

## 2018-03-06 NOTE — DISCHARGE NOTE OB - PROVIDER TOKENS
FREE:[LAST:[OBGYN clinic],PHONE:[phone]],FAX:[(   )    -],ADDRESS:[[facility], 3rd floor oncology building/ambulatory clinic]]

## 2018-03-08 DIAGNOSIS — O36.4XX0 MATERNAL CARE FOR INTRAUTERINE DEATH, NOT APPLICABLE OR UNSPECIFIED: ICD-10-CM

## 2018-03-08 DIAGNOSIS — Z3A.22 22 WEEKS GESTATION OF PREGNANCY: ICD-10-CM

## 2018-03-09 DIAGNOSIS — R52 PAIN, UNSPECIFIED: ICD-10-CM

## 2018-04-03 LAB — SURGICAL PATHOLOGY STUDY: SIGNIFICANT CHANGE UP

## 2018-04-07 NOTE — ED ADULT NURSE NOTE - VOIDING
Patient Seen in: [facility] Immediate Care In [address] [place] [address]    History   Patient presents with:  Shoulder Pain    Stated Complaint: Lt Shoulder Pain    HPI    Patient complains of left shoulder pain which he has had for several days.   He denies any his arm elevation and abduction there is no lymphangitis noted  Lungs are clear to auscultation  Cardiac there are normal first and second heart sounds  Back is nontender to palpation  Skin there is no rash present    ICC  Course      Xr Shoulder, Complete (mi cervical spine. 2. Minimal C4-C5 and C5-C6 spondylosis.      Dictated by (CST): Cameron Taylor MD on 4/07/2018 at 12:39     Approved by (CST): Cameron Taylor MD on 4/07/2018 at 12:40          MDM       Etiology  of patient's symptoms are uncertain advised burning with urination/urgency with voiding

## 2018-04-20 ENCOUNTER — OUTPATIENT (OUTPATIENT)
Dept: OUTPATIENT SERVICES | Facility: HOSPITAL | Age: 23
LOS: 1 days | End: 2018-04-20

## 2018-04-20 ENCOUNTER — APPOINTMENT (OUTPATIENT)
Dept: OBGYN | Facility: HOSPITAL | Age: 23
End: 2018-04-20

## 2018-04-20 VITALS — SYSTOLIC BLOOD PRESSURE: 121 MMHG | HEART RATE: 76 BPM | WEIGHT: 164.19 LBS | DIASTOLIC BLOOD PRESSURE: 64 MMHG

## 2018-04-20 DIAGNOSIS — Z30.9 ENCOUNTER FOR CONTRACEPTIVE MANAGEMENT, UNSPECIFIED: ICD-10-CM

## 2018-04-20 DIAGNOSIS — Z33.2 ENCOUNTER FOR ELECTIVE TERMINATION OF PREGNANCY: ICD-10-CM

## 2018-04-20 DIAGNOSIS — Z87.59 PERSONAL HISTORY OF OTHER COMPLICATIONS OF PREGNANCY, CHILDBIRTH AND THE PUERPERIUM: ICD-10-CM

## 2018-04-20 RX ORDER — LEVONORGESTREL 1.5 MG/1
1.5 TABLET ORAL
Qty: 1 | Refills: 0 | Status: ACTIVE | COMMUNITY
Start: 2018-04-20

## 2018-04-20 RX ORDER — NORETHINDRONE 0.35 MG/1
0.35 TABLET ORAL DAILY
Qty: 30 | Refills: 2 | Status: ACTIVE | COMMUNITY
Start: 2018-04-20 | End: 1900-01-01

## 2018-09-27 ENCOUNTER — EMERGENCY (EMERGENCY)
Facility: HOSPITAL | Age: 23
LOS: 1 days | Discharge: HOME | End: 2018-09-27
Attending: EMERGENCY MEDICINE | Admitting: EMERGENCY MEDICINE
Payer: MEDICAID

## 2018-09-27 VITALS
TEMPERATURE: 98 F | HEART RATE: 72 BPM | DIASTOLIC BLOOD PRESSURE: 63 MMHG | RESPIRATION RATE: 18 BRPM | OXYGEN SATURATION: 100 % | SYSTOLIC BLOOD PRESSURE: 118 MMHG

## 2018-09-27 PROCEDURE — 99282 EMERGENCY DEPT VISIT SF MDM: CPT

## 2018-09-27 NOTE — ED PROVIDER NOTE - NEURO NEGATIVE STATEMENT, MLM
no loss of consciousness, no gait abnormality, no headache, no sensory deficits, and no weakness.
None

## 2018-09-27 NOTE — ED ADULT NURSE NOTE - OBJECTIVE STATEMENT
BIB ems for reports of feeling depressed since the murder of her bf/child's father on Tuesday. Pt is awake, a&ox3, calm. Denies s/i h/i or a/v/h. Pt has been able to take care of herself and her infant baby, including breastfeeding. Denies drug/etoh use.

## 2018-09-27 NOTE — ED BEHAVIORAL HEALTH NOTE - BEHAVIORAL HEALTH NOTE
Writer met with pt, pt's mother, Yudy Palomo #[phone], and pt's infant son in low acuity area. Pt provided permission for writer to speak in front of mother. Pt was also breast feeding son at this time. Pt also BIB EMS from home for depressed mood following death of boyfriend x3 days ago. Pt currently denies SI and HI. Writer offered support to pt and mother. Writer also provided pt with resources for [facility] outpatient clinic, [facility] walk in clinics, and medicaid transportation form 2015. Writer also explained medicaid transportation request form 2015 to pt and her mother. Pt and mother also verbalized understanding of information offered. No additional questions or concerns were reported.

## 2018-09-27 NOTE — ED PROVIDER NOTE - OBJECTIVE STATEMENT
22 yo female with no h/o psychiatric illness brought in by her mom because she has been depressed over the last 3 days. Pt's boyfriend was murdered 3 days ago which triggered her feelings of sadness. Pt endorses that she has been crying and having decreased appetite  since the event. She denies suicidal ideations, homicidal ideations and audio/visual hallucinations. Pt currently living with mom and her child.

## 2018-09-27 NOTE — ED ADULT NURSE NOTE - NSIMPLEMENTINTERV_GEN_ALL_ED
Implemented All Universal Safety Interventions:  [place] to call system. Call bell, personal items and telephone within reach. Instruct patient to call for assistance. Room bathroom lighting operational. Non-slip footwear when patient is off stretcher. Physically safe environment: no spills, clutter or unnecessary equipment. Stretcher in lowest position, wheels locked, appropriate side rails in place.

## 2018-09-27 NOTE — ED ADULT TRIAGE NOTE - CHIEF COMPLAINT QUOTE
Pt arrives via EMS from home accompanied by her mother and infant son. Pt states her boyfriend was murdered 3 days ago and since that time she has been severely depressed, crying all day and been experiencing loss of appetite.  Pt denies SI/HI; denies alcohol or drug use; also denies medical complaints.  EMS: FS=85 en route to ER.

## 2019-01-07 NOTE — ED ADULT NURSE NOTE - NS ED NURSE DC TEACHING
Final Anesthesia Post-op Assessment    Patient: Nelson Beck  Procedure(s) Performed: RIGHT EYE PHACOEMULSIFICATION OF CATARACT WITH INSERTION OF POSTERIOR CHAMBER IMPLANT  Anesthesia type: Monitor Anesthesia Care    Vital Last Value   Temperature 36.1 Â°C (96.9 Â°F) (01/07/19 0859)   Pulse 58 (01/07/19 1057)   Respiratory Rate 16 (01/07/19 1057)   Non-Invasive   Blood Pressure 113/55 (01/07/19 1057)   Arterial  Blood Pressure     Pulse Oximetry 98 % (01/07/19 1057)     Last 24 I/O:     Intake/Output Summary (Last 24 hours) at 1/7/2019 1100  Last data filed at 1/7/2019 1057  Gross per 24 hour   Intake 300 ml   Output --   Net 300 ml       POST-OP VITAL SIGNS: stable  LEVEL OF CONSCIOUSNESS: participates in exam, awake, alert and answers questions appropriately  RESPIRATORY STATUS: unassisted, spontaneous ventilation and room air  CARDIOVASCULAR: stable and blood pressure returned to baseline  HYDRATION: euvolemic    PAIN MANAGEMENT: well controlled  NAUSEA: None  AIRWAY PATENCY: patent  POST-OP ASSESSMENT: no complications, patient tolerated procedure well with no complications and sufficiently recovered from acute administration of anesthesia effects and able to participate in evaluation  COMPLICATIONS: none
OB/GYN

## 2019-05-15 ENCOUNTER — EMERGENCY (EMERGENCY)
Facility: HOSPITAL | Age: 24
LOS: 1 days | Discharge: HOME | End: 2019-05-15
Attending: EMERGENCY MEDICINE | Admitting: EMERGENCY MEDICINE
Payer: MEDICAID

## 2019-05-15 VITALS
DIASTOLIC BLOOD PRESSURE: 79 MMHG | OXYGEN SATURATION: 100 % | SYSTOLIC BLOOD PRESSURE: 136 MMHG | TEMPERATURE: 99 F | HEART RATE: 97 BPM | RESPIRATION RATE: 18 BRPM

## 2019-05-15 PROCEDURE — 71046 X-RAY EXAM CHEST 2 VIEWS: CPT | Mod: 26

## 2019-05-15 PROCEDURE — 99285 EMERGENCY DEPT VISIT HI MDM: CPT | Mod: 25

## 2019-05-15 RX ORDER — IPRATROPIUM/ALBUTEROL SULFATE 18-103MCG
3 AEROSOL WITH ADAPTER (GRAM) INHALATION
Refills: 0 | Status: COMPLETED | OUTPATIENT
Start: 2019-05-15 | End: 2019-05-16

## 2019-05-15 RX ORDER — MAGNESIUM SULFATE 500 MG/ML
1 VIAL (ML) INJECTION ONCE
Refills: 0 | Status: COMPLETED | OUTPATIENT
Start: 2019-05-15 | End: 2019-05-15

## 2019-05-15 RX ADMIN — Medication 100 GRAM(S): at 23:59

## 2019-05-15 NOTE — ED PROVIDER NOTE - OBJECTIVE STATEMENT
24 y/o female with a PMHx of asthma presents to the ED c/o wheezing and SOB since this morning. She states that she has been hospitalized for asthma once as a child in the remote past, never intubated. She has had URI symptoms since Sunday, took home albuterol today with minimal efficacy. No tobacco use, +occasional marijuana use (last over the weekend). No other acute complaints at time of eval.

## 2019-05-15 NOTE — ED PROVIDER NOTE - CLINICAL SUMMARY MEDICAL DECISION MAKING FREE TEXT BOX
24 y/o female with a PMHx of asthma presenting with wheezing and SOB today. Acute asthma exacerbation likely, secondary to URI vs. pneumonia. Will obtain CBC, CMP, CXR. Will give steroids, DuoNebs, Magnesium. May require obs for continued bronchodilatory therapy.

## 2019-05-15 NOTE — ED ADULT TRIAGE NOTE - CHIEF COMPLAINT QUOTE
Pt c/o wheezing and sob since this AM. Currently experiencing URI since the weekend. H/o asthma. Received one albuterol treatment en route to ED by EMS. Denies hx of intubation.

## 2019-05-15 NOTE — ED PROVIDER NOTE - PROGRESS NOTE DETAILS
MD CHO:  Pt improved with increased aeration, louder wheezing, continues to feel sob.  Will obs for continued bronchodilator therapy.

## 2019-05-16 VITALS
OXYGEN SATURATION: 100 % | SYSTOLIC BLOOD PRESSURE: 122 MMHG | RESPIRATION RATE: 16 BRPM | DIASTOLIC BLOOD PRESSURE: 64 MMHG | HEART RATE: 101 BPM | TEMPERATURE: 98 F

## 2019-05-16 LAB
ANION GAP SERPL CALC-SCNC: 10 MMO/L — SIGNIFICANT CHANGE UP (ref 7–14)
ANION GAP SERPL CALC-SCNC: 13 MMO/L — SIGNIFICANT CHANGE UP (ref 7–14)
ANISOCYTOSIS BLD QL: SLIGHT — SIGNIFICANT CHANGE UP
B PERT DNA SPEC QL NAA+PROBE: NOT DETECTED — SIGNIFICANT CHANGE UP
BASOPHILS # BLD AUTO: 0.01 K/UL — SIGNIFICANT CHANGE UP (ref 0–0.2)
BASOPHILS # BLD AUTO: 0.01 K/UL — SIGNIFICANT CHANGE UP (ref 0–0.2)
BASOPHILS NFR BLD AUTO: 0.2 % — SIGNIFICANT CHANGE UP (ref 0–2)
BASOPHILS NFR BLD AUTO: 0.2 % — SIGNIFICANT CHANGE UP (ref 0–2)
BASOPHILS NFR SPEC: 0 % — SIGNIFICANT CHANGE UP (ref 0–2)
BLASTS # FLD: 0 % — SIGNIFICANT CHANGE UP (ref 0–0)
BUN SERPL-MCNC: 11 MG/DL — SIGNIFICANT CHANGE UP (ref 7–23)
BUN SERPL-MCNC: 7 MG/DL — SIGNIFICANT CHANGE UP (ref 7–23)
C PNEUM DNA SPEC QL NAA+PROBE: NOT DETECTED — SIGNIFICANT CHANGE UP
CALCIUM SERPL-MCNC: 9.2 MG/DL — SIGNIFICANT CHANGE UP (ref 8.4–10.5)
CALCIUM SERPL-MCNC: 9.3 MG/DL — SIGNIFICANT CHANGE UP (ref 8.4–10.5)
CHLORIDE SERPL-SCNC: 106 MMOL/L — SIGNIFICANT CHANGE UP (ref 98–107)
CHLORIDE SERPL-SCNC: 98 MMOL/L — SIGNIFICANT CHANGE UP (ref 98–107)
CO2 SERPL-SCNC: 21 MMOL/L — LOW (ref 22–31)
CO2 SERPL-SCNC: 23 MMOL/L — SIGNIFICANT CHANGE UP (ref 22–31)
CREAT SERPL-MCNC: 0.5 MG/DL — SIGNIFICANT CHANGE UP (ref 0.5–1.3)
CREAT SERPL-MCNC: 0.6 MG/DL — SIGNIFICANT CHANGE UP (ref 0.5–1.3)
DACRYOCYTES BLD QL SMEAR: SLIGHT — SIGNIFICANT CHANGE UP
EOSINOPHIL # BLD AUTO: 0.01 K/UL — SIGNIFICANT CHANGE UP (ref 0–0.5)
EOSINOPHIL # BLD AUTO: 0.24 K/UL — SIGNIFICANT CHANGE UP (ref 0–0.5)
EOSINOPHIL NFR BLD AUTO: 0.2 % — SIGNIFICANT CHANGE UP (ref 0–6)
EOSINOPHIL NFR BLD AUTO: 4 % — SIGNIFICANT CHANGE UP (ref 0–6)
EOSINOPHIL NFR FLD: 0 % — SIGNIFICANT CHANGE UP (ref 0–6)
FLUAV H1 2009 PAND RNA SPEC QL NAA+PROBE: NOT DETECTED — SIGNIFICANT CHANGE UP
FLUAV H1 RNA SPEC QL NAA+PROBE: NOT DETECTED — SIGNIFICANT CHANGE UP
FLUAV H3 RNA SPEC QL NAA+PROBE: NOT DETECTED — SIGNIFICANT CHANGE UP
FLUAV SUBTYP SPEC NAA+PROBE: NOT DETECTED — SIGNIFICANT CHANGE UP
FLUBV RNA SPEC QL NAA+PROBE: NOT DETECTED — SIGNIFICANT CHANGE UP
GIANT PLATELETS BLD QL SMEAR: PRESENT — SIGNIFICANT CHANGE UP
GLUCOSE SERPL-MCNC: 141 MG/DL — HIGH (ref 70–99)
GLUCOSE SERPL-MCNC: 87 MG/DL — SIGNIFICANT CHANGE UP (ref 70–99)
HADV DNA SPEC QL NAA+PROBE: NOT DETECTED — SIGNIFICANT CHANGE UP
HBA1C BLD-MCNC: 4.7 % — SIGNIFICANT CHANGE UP (ref 4–5.6)
HCOV PNL SPEC NAA+PROBE: SIGNIFICANT CHANGE UP
HCT VFR BLD CALC: 36.4 % — SIGNIFICANT CHANGE UP (ref 34.5–45)
HCT VFR BLD CALC: 36.8 % — SIGNIFICANT CHANGE UP (ref 34.5–45)
HGB BLD-MCNC: 11.7 G/DL — SIGNIFICANT CHANGE UP (ref 11.5–15.5)
HGB BLD-MCNC: 11.9 G/DL — SIGNIFICANT CHANGE UP (ref 11.5–15.5)
HMPV RNA SPEC QL NAA+PROBE: NOT DETECTED — SIGNIFICANT CHANGE UP
HPIV1 RNA SPEC QL NAA+PROBE: NOT DETECTED — SIGNIFICANT CHANGE UP
HPIV2 RNA SPEC QL NAA+PROBE: NOT DETECTED — SIGNIFICANT CHANGE UP
HPIV3 RNA SPEC QL NAA+PROBE: NOT DETECTED — SIGNIFICANT CHANGE UP
HPIV4 RNA SPEC QL NAA+PROBE: NOT DETECTED — SIGNIFICANT CHANGE UP
IMM GRANULOCYTES NFR BLD AUTO: 0.3 % — SIGNIFICANT CHANGE UP (ref 0–1.5)
IMM GRANULOCYTES NFR BLD AUTO: 0.5 % — SIGNIFICANT CHANGE UP (ref 0–1.5)
LYMPHOCYTES # BLD AUTO: 0.39 K/UL — LOW (ref 1–3.3)
LYMPHOCYTES # BLD AUTO: 1.47 K/UL — SIGNIFICANT CHANGE UP (ref 1–3.3)
LYMPHOCYTES # BLD AUTO: 24.5 % — SIGNIFICANT CHANGE UP (ref 13–44)
LYMPHOCYTES # BLD AUTO: 9.7 % — LOW (ref 13–44)
LYMPHOCYTES NFR SPEC AUTO: 10.6 % — LOW (ref 13–44)
MCHC RBC-ENTMCNC: 28.3 PG — SIGNIFICANT CHANGE UP (ref 27–34)
MCHC RBC-ENTMCNC: 28.6 PG — SIGNIFICANT CHANGE UP (ref 27–34)
MCHC RBC-ENTMCNC: 32.1 % — SIGNIFICANT CHANGE UP (ref 32–36)
MCHC RBC-ENTMCNC: 32.3 % — SIGNIFICANT CHANGE UP (ref 32–36)
MCV RBC AUTO: 87.6 FL — SIGNIFICANT CHANGE UP (ref 80–100)
MCV RBC AUTO: 89 FL — SIGNIFICANT CHANGE UP (ref 80–100)
METAMYELOCYTES # FLD: 0 % — SIGNIFICANT CHANGE UP (ref 0–1)
MICROCYTES BLD QL: SLIGHT — SIGNIFICANT CHANGE UP
MONOCYTES # BLD AUTO: 0.1 K/UL — SIGNIFICANT CHANGE UP (ref 0–0.9)
MONOCYTES # BLD AUTO: 0.6 K/UL — SIGNIFICANT CHANGE UP (ref 0–0.9)
MONOCYTES NFR BLD AUTO: 10 % — SIGNIFICANT CHANGE UP (ref 2–14)
MONOCYTES NFR BLD AUTO: 2.5 % — SIGNIFICANT CHANGE UP (ref 2–14)
MONOCYTES NFR BLD: 1.5 % — LOW (ref 2–9)
MYELOCYTES NFR BLD: 0 % — SIGNIFICANT CHANGE UP (ref 0–0)
NEUTROPHIL AB SER-ACNC: 87.9 % — HIGH (ref 43–77)
NEUTROPHILS # BLD AUTO: 3.51 K/UL — SIGNIFICANT CHANGE UP (ref 1.8–7.4)
NEUTROPHILS # BLD AUTO: 3.66 K/UL — SIGNIFICANT CHANGE UP (ref 1.8–7.4)
NEUTROPHILS NFR BLD AUTO: 61 % — SIGNIFICANT CHANGE UP (ref 43–77)
NEUTROPHILS NFR BLD AUTO: 86.9 % — HIGH (ref 43–77)
NEUTS BAND # BLD: 0 % — SIGNIFICANT CHANGE UP (ref 0–6)
NRBC # FLD: 0 K/UL — SIGNIFICANT CHANGE UP (ref 0–0)
NRBC # FLD: 0 K/UL — SIGNIFICANT CHANGE UP (ref 0–0)
OTHER - HEMATOLOGY %: 0 — SIGNIFICANT CHANGE UP
OVALOCYTES BLD QL SMEAR: SLIGHT — SIGNIFICANT CHANGE UP
PLATELET # BLD AUTO: 224 K/UL — SIGNIFICANT CHANGE UP (ref 150–400)
PLATELET # BLD AUTO: 225 K/UL — SIGNIFICANT CHANGE UP (ref 150–400)
PLATELET COUNT - ESTIMATE: NORMAL — SIGNIFICANT CHANGE UP
PMV BLD: 11 FL — SIGNIFICANT CHANGE UP (ref 7–13)
PMV BLD: 11.3 FL — SIGNIFICANT CHANGE UP (ref 7–13)
POTASSIUM SERPL-MCNC: 3.1 MMOL/L — LOW (ref 3.5–5.3)
POTASSIUM SERPL-MCNC: 4.1 MMOL/L — SIGNIFICANT CHANGE UP (ref 3.5–5.3)
POTASSIUM SERPL-SCNC: 3.1 MMOL/L — LOW (ref 3.5–5.3)
POTASSIUM SERPL-SCNC: 4.1 MMOL/L — SIGNIFICANT CHANGE UP (ref 3.5–5.3)
PROMYELOCYTES # FLD: 0 % — SIGNIFICANT CHANGE UP (ref 0–0)
RBC # BLD: 4.09 M/UL — SIGNIFICANT CHANGE UP (ref 3.8–5.2)
RBC # BLD: 4.2 M/UL — SIGNIFICANT CHANGE UP (ref 3.8–5.2)
RBC # FLD: 12.3 % — SIGNIFICANT CHANGE UP (ref 10.3–14.5)
RBC # FLD: 12.4 % — SIGNIFICANT CHANGE UP (ref 10.3–14.5)
RSV RNA SPEC QL NAA+PROBE: NOT DETECTED — SIGNIFICANT CHANGE UP
RV+EV RNA SPEC QL NAA+PROBE: DETECTED — HIGH
SODIUM SERPL-SCNC: 131 MMOL/L — LOW (ref 135–145)
SODIUM SERPL-SCNC: 140 MMOL/L — SIGNIFICANT CHANGE UP (ref 135–145)
VARIANT LYMPHS # BLD: 0 % — SIGNIFICANT CHANGE UP
WBC # BLD: 4.04 K/UL — SIGNIFICANT CHANGE UP (ref 3.8–10.5)
WBC # BLD: 6 K/UL — SIGNIFICANT CHANGE UP (ref 3.8–10.5)
WBC # FLD AUTO: 4.04 K/UL — SIGNIFICANT CHANGE UP (ref 3.8–10.5)
WBC # FLD AUTO: 6 K/UL — SIGNIFICANT CHANGE UP (ref 3.8–10.5)

## 2019-05-16 PROCEDURE — 99236 HOSP IP/OBS SAME DATE HI 85: CPT

## 2019-05-16 RX ORDER — ALBUTEROL 90 UG/1
3 AEROSOL, METERED ORAL
Qty: 100 | Refills: 0
Start: 2019-05-16 | End: 2019-06-14

## 2019-05-16 RX ORDER — IPRATROPIUM/ALBUTEROL SULFATE 18-103MCG
3 AEROSOL WITH ADAPTER (GRAM) INHALATION EVERY 4 HOURS
Refills: 0 | Status: DISCONTINUED | OUTPATIENT
Start: 2019-05-16 | End: 2019-05-19

## 2019-05-16 RX ORDER — SODIUM CHLORIDE 9 MG/ML
1000 INJECTION INTRAMUSCULAR; INTRAVENOUS; SUBCUTANEOUS
Refills: 0 | Status: DISCONTINUED | OUTPATIENT
Start: 2019-05-16 | End: 2019-05-19

## 2019-05-16 RX ORDER — SODIUM CHLORIDE 9 MG/ML
1000 INJECTION INTRAMUSCULAR; INTRAVENOUS; SUBCUTANEOUS ONCE
Refills: 0 | Status: COMPLETED | OUTPATIENT
Start: 2019-05-16 | End: 2019-05-16

## 2019-05-16 RX ORDER — POTASSIUM CHLORIDE 20 MEQ
40 PACKET (EA) ORAL ONCE
Refills: 0 | Status: COMPLETED | OUTPATIENT
Start: 2019-05-16 | End: 2019-05-16

## 2019-05-16 RX ORDER — EPINEPHRINE 0.3 MG/.3ML
0.3 INJECTION INTRAMUSCULAR; SUBCUTANEOUS ONCE
Refills: 0 | Status: COMPLETED | OUTPATIENT
Start: 2019-05-16 | End: 2019-05-16

## 2019-05-16 RX ORDER — IBUPROFEN 200 MG
800 TABLET ORAL ONCE
Refills: 0 | Status: COMPLETED | OUTPATIENT
Start: 2019-05-16 | End: 2019-05-16

## 2019-05-16 RX ORDER — IPRATROPIUM/ALBUTEROL SULFATE 18-103MCG
3 AEROSOL WITH ADAPTER (GRAM) INHALATION
Refills: 0 | Status: COMPLETED | OUTPATIENT
Start: 2019-05-16 | End: 2019-05-16

## 2019-05-16 RX ORDER — ALBUTEROL 90 UG/1
2 AEROSOL, METERED ORAL
Qty: 1 | Refills: 0
Start: 2019-05-16 | End: 2019-05-19

## 2019-05-16 RX ORDER — MAGNESIUM SULFATE 500 MG/ML
2 VIAL (ML) INJECTION ONCE
Refills: 0 | Status: COMPLETED | OUTPATIENT
Start: 2019-05-16 | End: 2019-05-16

## 2019-05-16 RX ORDER — MAGNESIUM SULFATE 500 MG/ML
1 VIAL (ML) INJECTION ONCE
Refills: 0 | Status: COMPLETED | OUTPATIENT
Start: 2019-05-16 | End: 2019-05-16

## 2019-05-16 RX ADMIN — Medication 3 MILLILITER(S): at 05:35

## 2019-05-16 RX ADMIN — Medication 800 MILLIGRAM(S): at 03:00

## 2019-05-16 RX ADMIN — Medication 40 MILLIGRAM(S): at 00:00

## 2019-05-16 RX ADMIN — Medication 3 MILLILITER(S): at 01:45

## 2019-05-16 RX ADMIN — Medication 1 GRAM(S): at 03:30

## 2019-05-16 RX ADMIN — Medication 1 GRAM(S): at 01:41

## 2019-05-16 RX ADMIN — Medication 100 GRAM(S): at 02:27

## 2019-05-16 RX ADMIN — EPINEPHRINE 0.3 MILLIGRAM(S): 0.3 INJECTION INTRAMUSCULAR; SUBCUTANEOUS at 10:18

## 2019-05-16 RX ADMIN — Medication 3 MILLILITER(S): at 14:09

## 2019-05-16 RX ADMIN — Medication 3 MILLILITER(S): at 00:00

## 2019-05-16 RX ADMIN — Medication 3 MILLILITER(S): at 00:23

## 2019-05-16 RX ADMIN — Medication 3 MILLILITER(S): at 01:18

## 2019-05-16 RX ADMIN — Medication 3 MILLILITER(S): at 00:25

## 2019-05-16 RX ADMIN — Medication 50 GRAM(S): at 10:18

## 2019-05-16 RX ADMIN — SODIUM CHLORIDE 150 MILLILITER(S): 9 INJECTION INTRAMUSCULAR; INTRAVENOUS; SUBCUTANEOUS at 02:27

## 2019-05-16 RX ADMIN — Medication 3 MILLILITER(S): at 01:55

## 2019-05-16 RX ADMIN — Medication 40 MILLIEQUIVALENT(S): at 01:18

## 2019-05-16 RX ADMIN — SODIUM CHLORIDE 1000 MILLILITER(S): 9 INJECTION INTRAMUSCULAR; INTRAVENOUS; SUBCUTANEOUS at 01:23

## 2019-05-16 RX ADMIN — Medication 20 MILLIGRAM(S): at 01:18

## 2019-05-16 RX ADMIN — Medication 3 MILLILITER(S): at 09:08

## 2019-05-16 RX ADMIN — Medication 800 MILLIGRAM(S): at 02:28

## 2019-05-16 RX ADMIN — SODIUM CHLORIDE 150 MILLILITER(S): 9 INJECTION INTRAMUSCULAR; INTRAVENOUS; SUBCUTANEOUS at 05:51

## 2019-05-16 NOTE — ED CDU PROVIDER INITIAL DAY NOTE - OBJECTIVE STATEMENT
22 y/o female with a PMHx of asthma presents to the ED c/o wheezing and SOB since this morning. She states that she has been hospitalized for asthma once as a child in the remote past, never intubated. She has had URI symptoms since Sunday, took home albuterol today with minimal efficacy. No tobacco use, +occasional marijuana use (last over the weekend). No other acute complaints at time of eval.    CDU ELODIA Weldon Note------  22 yo female, PMH of asthma (no intubation hx), presented to the ED for wheezing, SOB as noted above.  Pt was evaluated in the ED, found to have moderate wheezing bilaterally, treated with nebs/prednisone/magnesium IV.  Pt. had some clinical improvement with initial meds given, but not significantly improved; pt was dispo'd to CDU for continued care plan:  Nebs/meds/IV hydration per orders, supportive care, general observation care / monitoring.  On CDU evaluation, pt mentions she has had cough, productive of yellow phlegm intermittently; this has been over the past few days.  No hx/o fevers/chills.  No other c/o.  No hx/o recent travel.  CDU care plan d/w pt who verbalized agreement with plan.

## 2019-05-16 NOTE — ED CDU PROVIDER INITIAL DAY NOTE - INDICATION FOR OBSERVATION
Therapeutic Intensity/Nebs/meds/IV hydration per orders, supportive care, general observation care / monitoring./Other

## 2019-05-16 NOTE — ED ADULT NURSE NOTE - OBJECTIVE STATEMENT
pt arrives a&ox3 amb to intake c/o asthma exacerbation unrelieved by home medications, advised by pmd to come to ed, speaking in full sentences without difficutly, however wheezes auscultated to lung bases, spow 100% on room air, c/o neck pain, denies chest pain,n/v/d, resps even and unlabored, 20g ivsl placed labs drawn and sent. report given to intake rn.

## 2019-05-16 NOTE — ED CDU PROVIDER INITIAL DAY NOTE - PROGRESS NOTE DETAILS
Pt objectively comfortable appearing in the interim; pt stable at present.  Will continue to monitor.  Pt. will be signed out to the day CDU PA (Leonides) and attending (Dr. Kramer) at 0700 hrs. Pt is feeling significantly better, scarse end exp wheezes noted over b/l lung fields, moves air well, will dc with albuterol/spacer, albuterol for nebulizer and prednisone. Pt is breastfeeding her 2 yr old, advised to pump and dump the milk while taking prednisone;

## 2019-05-16 NOTE — ED CDU PROVIDER DISPOSITION NOTE - NSFOLLOWUPINSTRUCTIONS_ED_ALL_ED_FT
Please make sure that use your Albuterol inhaler every 4 hrs for the next 48hrs,later every 6 hrs x 48hrs, later as needed. You will also be taking prednisone 50mg x 4 days (dont breast feed, pump and dump. Continue taking your daily medications, follow up with your doctor within 72 hrs. Return to ER ASAP for difficulty breathing, fever,  chest pain, if you use  Albuterol inhaler more frequently than 3 hrs.

## 2019-05-16 NOTE — ED CDU PROVIDER DISPOSITION NOTE - CLINICAL COURSE
Pt is 24 y/o female with Pmhx of asthma (well controlled, hasn't had "episode" since childhood) in CDU for asthma exacerbation; pt states that she has been having seasonal allergy and URI sx, has been using her nieces nebulizer w/o relief (pt has been using normal saline vials instead of Albuterol). In CDu pt received duonebs, steroids, mag and epi with significant improvement of symptoms. spacer education given, will f/u with pcp; Pt is 24 y/o female with Pmhx of asthma (well controlled, hasn't had "episode" since childhood) in CDU for asthma exacerbation; pt states that she has been having seasonal allergy and URI sx, has been using her nieces nebulizer w/o relief (pt has been using normal saline vials instead of Albuterol). In CDU pt received duonebs, steroids, mag and epi with significant improvement of symptoms. spacer education given, will f/u with pcp.

## 2019-05-16 NOTE — ED CDU PROVIDER INITIAL DAY NOTE - BREATH SOUNDS
Diminished breath sounds in general with faint expiratory wheezing noted bilaterally.  No rales/rhonchi/retractions.

## 2019-07-02 ENCOUNTER — EMERGENCY (EMERGENCY)
Facility: HOSPITAL | Age: 24
LOS: 1 days | Discharge: HOME | End: 2019-07-02
Attending: STUDENT IN AN ORGANIZED HEALTH CARE EDUCATION/TRAINING PROGRAM | Admitting: STUDENT IN AN ORGANIZED HEALTH CARE EDUCATION/TRAINING PROGRAM
Payer: MEDICAID

## 2019-07-02 VITALS
SYSTOLIC BLOOD PRESSURE: 124 MMHG | DIASTOLIC BLOOD PRESSURE: 70 MMHG | RESPIRATION RATE: 16 BRPM | HEART RATE: 65 BPM | OXYGEN SATURATION: 100 % | TEMPERATURE: 98 F

## 2019-07-02 PROCEDURE — 99283 EMERGENCY DEPT VISIT LOW MDM: CPT

## 2019-07-02 NOTE — ED PROVIDER NOTE - OBJECTIVE STATEMENT
Erma Wilson M.D: 23F hx asthma p/w 2 days of numbness/tingling to b/l hands with some swelling/tightness sensation. no pain associated with this. notes at times fingers become pale, cool, and numbness worsens. symptoms are constant. notes symptoms worse in the AM, better throughout day. no neck pain no headaches no blurry vision. had similar symptoms 2 months ago that resolved on their own after 2 weeks. denies any rash, itching, difficulty breathing, n/v/d.

## 2019-07-02 NOTE — ED PROVIDER NOTE - NSFOLLOWUPINSTRUCTIONS_ED_ALL_ED_FT
Please wear cock-up wrist splint at night while sleeping  please follow up in the next 2 weeks with rheumatology  please return to the ER for any of the following: weakness in your hands, dropping objects due to weakness/numbness in hands, headaches, neck pain, or any new or worsening/concerning symptoms.      Carpal tunnel syndrome is a condition that causes pain and numbness in the fingers and hands, and sometimes the arms. It happens when a nerve in the wrist called the "median nerve" gets pinched or squeezed.    The median nerve goes through a tunnel in the wrist that is formed by the bones of the wrist and a tough band of tissue called a "ligament" (figure 1). Experts do not know exactly how the nerve can get pinched, but they think it might happen when:    ?Tendons that go through the same tunnel get swollen (tendons are bands of tissue that connect muscles to bones)    ?Tissues surrounding the tendons harden    ?People hold their hands in a position that makes the tunnel smaller    ?People use their hands for work that involves repetitive or forceful movements      The median nerve carries signals about sensation – it tells the brain what the hand is "feeling." It gets input from these parts of the hand:    ?Thumb    ?Index finger    ?Middle finger    ?Parts of the ring finger    ?Parts of the palm closest to the thumb      Women are more likely than men to get carpal tunnel syndrome. Being overweight probably increases the risk of carpal tunnel syndrome. Examples of other conditions that might increase the risk include pregnancy, diabetes, and rheumatoid arthritis.      What are the symptoms of carpal tunnel syndrome?  The symptoms include pain and tingling in the thumb and the index, middle, and ring fingers (figure 1). Symptoms are typically worst at night and can wake you up from sleep. Often the symptoms affect both hands, but one hand might have worse symptoms than the other.    In some cases, pain and tingling can extend to the whole hand or even up to the wrist and forearm. Rarely, pain and tingling extends past the elbow to the shoulder.    The symptoms can also flare up when you do things that involve bending and unbending your wrist or raising your arms. Some activities can trigger symptoms in people with carpal tunnel syndrome. But they do not actually cause the condition. Examples include:    ?Driving    ?Reading    ?Typing    ?Holding a phone      In many people, the symptoms come and go. But some people eventually have symptoms all the time. They can end up having trouble moving their fingers or controlling their .      Is there a test for carpal tunnel syndrome?  Yes. Electrical tests of the nerves can show if you have carpal tunnel syndrome, but these tests are not always necessary.    Your doctor will probably be able to tell if you have carpal tunnel syndrome by learning about your symptoms and doing an exam. During the exam, he or she might tap on or press on your wrist, or ask you to hold your hands in ways that are known to make symptoms worse.    Electrical nerve tests can prove if you really have carpal tunnel syndrome. Doctors usually order these tests for people who might need surgery to treat their condition.    ?Nerve conduction studies – Nerve conduction studies can show whether the median nerve is carrying electrical signals the right way. In people with carpal tunnel syndrome, signals can be slow or weak.      ?Electromyography – Electromyography, also called EMG, can show whether the muscles in the hand and wrist are responding the right way to electrical signals. This test is most useful in checking whether another condition besides carpal tunnel syndrome might be causing the symptoms.        Should I see a doctor or nurse?  See your doctor or nurse if you develop the symptoms described above, and they bother you.      How is carpal tunnel syndrome treated?  Treatments are often combined and can include:    ?Wrist splints – Some people feel better if they wear splints at night that keep their hands in a "neutral position." The neutral position is when the wrist is not bent forward or backward and the fingers are curled naturally toward the palm.      ?Steroid shots or pills – Steroids are a group of medicines that control inflammation and swelling. To treat carpal tunnel syndrome, doctors sometimes inject steroids into the carpal tunnel. People who do not want to get a shot can take steroids in pill form instead. But the pills are less effective than the shot.      ?Other physical treatments – There is weak evidence that yoga or another treatment called "carpal bone mobilization" might help some people with carpal tunnel syndrome. For carpal bone mobilization, a physical or occupational therapist moves your hand or wrist around in a special way, so that the bones in the wrist move.      ?Surgery – Doctors offer surgery to people who have ongoing or severe nerve damage that is causing the symptoms of carpal tunnel syndrome. Surgery for carpal tunnel syndrome involves cutting the ligament that stretches across the wrist to form the tunnel. However, women who get carpal tunnel syndrome during pregnancy usually don't need surgery. In most cases, the symptoms gradually improve after the baby is born.        Can carpal tunnel syndrome be prevented?  It's unclear whether there is any way to prevent carpal tunnel syndrome. People sometimes think that the condition happens because they use a computer too much. But studies have shown that computer use is probably not related to carpal tunnel syndrome.      SUMMARY AND RECOMMENDATIONS    ?The scleroderma disorders comprise a heterogeneous group of conditions linked by the presence of thickened, sclerotic skin lesions. The simplest division of the scleroderma-related disorders is into localized and systemic forms of the disease (table 1 and table 2). The term systemic sclerosis (SSc) is more appropriate for the latter forms; it emphasizes that frequent involvement of internal organs is generally the most important manifestation of these conditions. (See 'Introduction' above.)      ?Localized scleroderma can be divided into linear scleroderma (“en coup de sabre”), which most commonly occurs in childhood and follows a dermatomal distribution on one side of the body, and localized and generalized morpheae, which are characterized by patches of sclerotic skin that develop on the trunk and limbs at sites of previously normal texture (table 1). Unlike SSc, generalized morphea typically spares the hands and face and is not associated with major vascular symptoms or with visceral disease. (See 'Localized scleroderma' above.)      ?The extent of skin involvement and the accompanying pattern of internal organ involvement form the basis for the classification of SSc (table 2). The principal subsets of SSc are (see 'Systemic sclerosis' above):      •Diffuse cutaneous SSc (dcSSc) (see 'Limited and diffuse cutaneous SSc' above)    •Limited cutaneous SSc (lcSSc) (see 'Limited and diffuse cutaneous SSc' above)    •SSc sine scleroderma, in which patients have only internal organ involvement (see 'SSc sine scleroderma' above)    •Environmentally-induced scleroderma (see 'Environmentally induced scleroderma' above)    •Overlap syndromes, in which features of SSc coexist with elements of other rheumatic disorders (see 'Overlap syndromes' above)      ?Classification criteria that define the presence of definite SSc have been developed and are widely used in practice despite having been primarily developed for research purposes. Although the items generally included in classification criteria reflect those used for the clinical diagnosis, there are other findings used in clinical practice that inform the diagnosis. (See 'Classification criteria' above.)

## 2019-07-02 NOTE — ED ADULT TRIAGE NOTE - CHIEF COMPLAINT QUOTE
pt comes to ED for swollen hands and fingers 2 days with numbness. . pt states this is the second time this happened to her. pt states the last time it last 2 weeks and did not go to doctor. pt denies any new soaps, hand wash or make up. pt denies any  speaking swallowing or rash. pt VSS NAD pt can move hand freely in triage. pt states her hands feel numb pt can feel RN touching hands just states slightly numb. MD JORDAN called to triage for stroke eval. No code stroke called in triage.

## 2019-07-02 NOTE — ED PROVIDER NOTE - CLINICAL SUMMARY MEDICAL DECISION MAKING FREE TEXT BOX
23F p/w bl hand numbness/tingling/swelling for 2 days. had similar episode 2 months ago that resolved spontaneously. notes symptoms worse in AM, sometimes with cold/pale fingers. no known trauma or injury to suggest bony pathology- no indication for xr. no neck pain radiating down arm to suggest radiculopathy. positive phalens sign may suggest an element of carpal tunnel syndrome especially given pt si waittress and has repetitive movements at work. no concern for stroke. also suspect an element of underlying rheum disorder- possibly scleroderma or raynauds. no indication for bloodwork at this time given rheum workup is largely sendout labs. will dc with rheum followup and instructions to use cockup splints at home while sleeping.

## 2019-07-02 NOTE — ED PROVIDER NOTE - PHYSICAL EXAMINATION
Erma Wilson M.D.:   patient awake alert seen lying on stretcher NAD .   EXT WWP no edema no calf tenderness 2+ radial pulse b/l. gross motor and sensory intact throughout m/u/r nerves in b/l UE. swelling noted to 3rd finger of right hand without overlying erythema, warmth, or tenderness. full ROM throughout wrists/hands/fingers without limitation or pain.. negative tinnel sign, positive phalen sign. no midline c spine tenderness  neuro A&Ox3 cn2-12 intact, gross motor and sensory intact in all extremitiesgait normal.    skin warm and dry no rash  HEENT: moist mucous membranes, PERRL, EOMI

## 2019-07-02 NOTE — ED PROVIDER NOTE - NSFOLLOWUPCLINICS_GEN_ALL_ED_FT
[facility] Rheumatology  Rheumatology  [address] [address]  Phone: [phone]  Fax:   Follow Up Time: 7-10 Days

## 2019-09-04 NOTE — DISCHARGE NOTE OB - BREASTFEEDING PROVIDES MATERNAL HEALTH BENEFITS, DECREASED PREMENOPAUSAL BREAST CANCER, OVARIAN CANCER AND TYPE II DIABETES MELLITUS
Pt wants script to be sent to [facility] since he is having a hard time getting it through [facility]
Statement Selected
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
## 2020-01-16 ENCOUNTER — EMERGENCY (EMERGENCY)
Facility: HOSPITAL | Age: 25
LOS: 1 days | Discharge: LEFT BEFORE DISCHARGE | End: 2020-01-16
Admitting: EMERGENCY MEDICINE

## 2020-01-16 VITALS
SYSTOLIC BLOOD PRESSURE: 113 MMHG | RESPIRATION RATE: 18 BRPM | HEART RATE: 99 BPM | TEMPERATURE: 98 F | DIASTOLIC BLOOD PRESSURE: 59 MMHG | OXYGEN SATURATION: 100 %

## 2020-01-16 NOTE — ED ADULT TRIAGE NOTE - CHIEF COMPLAINT QUOTE
Pt c/o asthma exacerbation states that she was cleaning the bathroom with bleach.  Pt states that she is 28 weeks pregnant, offers no obstetrical complaints. Speaking full sentences in triage. Past medical history: placental abruption with still birth

## 2020-02-25 ENCOUNTER — OUTPATIENT (OUTPATIENT)
Dept: INPATIENT UNIT | Facility: HOSPITAL | Age: 25
LOS: 1 days | Discharge: HOME | End: 2020-02-25
Payer: MEDICAID

## 2020-02-25 ENCOUNTER — EMERGENCY (EMERGENCY)
Facility: HOSPITAL | Age: 25
LOS: 1 days | Discharge: STILL ADMITTED OR ADMINISTRATIVE | End: 2020-02-25
Admitting: EMERGENCY MEDICINE

## 2020-02-25 VITALS
SYSTOLIC BLOOD PRESSURE: 115 MMHG | HEART RATE: 72 BPM | TEMPERATURE: 99 F | DIASTOLIC BLOOD PRESSURE: 49 MMHG | RESPIRATION RATE: 16 BRPM

## 2020-02-25 VITALS
DIASTOLIC BLOOD PRESSURE: 73 MMHG | HEART RATE: 106 BPM | TEMPERATURE: 100 F | RESPIRATION RATE: 15 BRPM | SYSTOLIC BLOOD PRESSURE: 113 MMHG

## 2020-02-25 DIAGNOSIS — O26.899 OTHER SPECIFIED PREGNANCY RELATED CONDITIONS, UNSPECIFIED TRIMESTER: ICD-10-CM

## 2020-02-25 DIAGNOSIS — Z3A.00 WEEKS OF GESTATION OF PREGNANCY NOT SPECIFIED: ICD-10-CM

## 2020-02-25 DIAGNOSIS — N75.0 CYST OF BARTHOLIN'S GLAND: Chronic | ICD-10-CM

## 2020-02-25 LAB
APPEARANCE UR: CLEAR — SIGNIFICANT CHANGE UP
BACTERIA # UR AUTO: NEGATIVE — SIGNIFICANT CHANGE UP
BILIRUB UR-MCNC: NEGATIVE — SIGNIFICANT CHANGE UP
BLOOD UR QL VISUAL: SIGNIFICANT CHANGE UP
COLOR SPEC: YELLOW — SIGNIFICANT CHANGE UP
GLUCOSE UR-MCNC: NEGATIVE — SIGNIFICANT CHANGE UP
HYALINE CASTS # UR AUTO: SIGNIFICANT CHANGE UP
KETONES UR-MCNC: >150 — HIGH
LEUKOCYTE ESTERASE UR-ACNC: NEGATIVE — SIGNIFICANT CHANGE UP
NITRITE UR-MCNC: NEGATIVE — SIGNIFICANT CHANGE UP
PH UR: 6.5 — SIGNIFICANT CHANGE UP (ref 5–8)
PROT UR-MCNC: 30 — SIGNIFICANT CHANGE UP
RBC CASTS # UR COMP ASSIST: SIGNIFICANT CHANGE UP (ref 0–?)
SP GR SPEC: 1.02 — SIGNIFICANT CHANGE UP (ref 1–1.04)
SQUAMOUS # UR AUTO: SIGNIFICANT CHANGE UP
UROBILINOGEN FLD QL: NORMAL — SIGNIFICANT CHANGE UP
WBC UR QL: SIGNIFICANT CHANGE UP (ref 0–?)

## 2020-02-25 PROCEDURE — 99214 OFFICE O/P EST MOD 30 MIN: CPT | Mod: 25

## 2020-02-25 PROCEDURE — 59025 FETAL NON-STRESS TEST: CPT | Mod: 26

## 2020-02-25 PROCEDURE — 81015 MICROSCOPIC EXAM OF URINE: CPT

## 2020-02-25 NOTE — OB PROVIDER TRIAGE NOTE - ADDITIONAL INSTRUCTIONS
hydration encouraged  labor precautions reviewed  fetal kick counts emphasized  patient educated to return in contractions begin to occur again with increased frequency and intensity

## 2020-02-25 NOTE — OB PROVIDER TRIAGE NOTE - NSHPLABSRESULTS_GEN_ALL_CORE
Urinalysis Basic - ( 2020 23:10 )    Color: YELLOW / Appearance: CLEAR / S.022 / pH: 6.5  Gluc: NEGATIVE / Ketone: >150  / Bili: NEGATIVE / Urobili: NORMAL   Blood: SMALL / Protein: 30 / Nitrite: NEGATIVE   Leuk Esterase: NEGATIVE / RBC: 0-2 / WBC 3-5   Sq Epi: FEW / Non Sq Epi: x / Bacteria: NEGATIVE

## 2020-02-25 NOTE — OB PROVIDER TRIAGE NOTE - NSHPPHYSICALEXAM_GEN_ALL_CORE
Vital Signs Last 24 Hrs  T(C): 37.2 (25 Feb 2020 22:30), Max: 37.5 (25 Feb 2020 21:25)  T(F): 99 (25 Feb 2020 22:30), Max: 99.5 (25 Feb 2020 21:25)  HR: 109 (25 Feb 2020 22:55) (72 - 109)  BP: 120/72 (25 Feb 2020 22:55) (113/73 - 120/72)  BP(mean): --  RR: 16 (25 Feb 2020 22:30) (15 - 16)  SpO2: --    abdomen soft nontender gravid  (+) FHR; moderate variability; with accelerations  irregular uterine contractions noted on tocometer    Vaginal Exam: closed    Biophysical Profile: 8/8  Amniotic Fluid Index: 9.92cm  cephalic; posterior

## 2020-02-25 NOTE — OB RN TRIAGE NOTE - PMH
Asthma  Last attack last year   (normal spontaneous vaginal delivery)  FT 7/15/16  Fetal demise @21 wks 18

## 2020-02-25 NOTE — OB PROVIDER TRIAGE NOTE - NSOBPROVIDERNOTE_OBGYN_ALL_OB_FT
pmh: asthma; last attack was in 2019  no hospitalizations per patient  anemia  psh: bartholin cysts Incision and Drainage   obhx: -FT- 2016 7lbs 9oz  IUFD @ 21weeks 2018; placental clots  gynhx: bartholin cyst    NKDA    Medications  PNV  Albuterol  ProAir  Iron    Assessment  Vital Signs Last 24 Hrs  T(C): 37.2 (2020 22:30), Max: 37.5 (2020 21:25)  T(F): 99 (2020 22:30), Max: 99.5 (2020 21:25)  HR: 109 (2020 22:55) (72 - 109)  BP: 120/72 (2020 22:55) (113/73 - 120/72)  BP(mean): --  RR: 16 (2020 22:30) (15 - 16)  SpO2: --    abdomen soft nontender gravid  (+) FHR; moderate variability; with accelerations  irregular uterine contractions noted on tocometer    Vaginal Exam: closed    Biophysical Profile:   Amniotic Fluid Index: 9.92cm  cephalic; posterior    case reviewed with Dr. So  pending UA   PO hydration  repeat exam in 2 hours pmh: asthma; last attack was in 2019  no hospitalizations per patient  anemia  psh: bartholin cysts Incision and Drainage   obhx: -FT- 2016 7lbs 9oz  IUFD @ 21weeks 2018; placental clots  gynhx: bartholin cyst    NKDA    Medications  PNV  Albuterol  ProAir  Iron    Assessment  Vital Signs Last 24 Hrs  T(C): 37.2 (2020 22:30), Max: 37.5 (2020 21:25)  T(F): 99 (2020 22:30), Max: 99.5 (2020 21:25)  HR: 109 (2020 22:55) (72 - 109)  BP: 120/72 (2020 22:55) (113/73 - 120/72)  BP(mean): --  RR: 16 (2020 22:30) (15 - 16)  SpO2: --    abdomen soft nontender gravid  (+) FHR; moderate variability; with accelerations  irregular uterine contractions noted on tocometer    Vaginal Exam: closed    Biophysical Profile:   Amniotic Fluid Index: 9.92cm  cephalic; posterior    case reviewed with Dr. So  pending UA   PO hydration  repeat exam in 2 hours    0016; Dr. So reviewed patient tracing  uterine contractions consistency decreased  patient reports decreased pain at this time; sleeping upon entry for re-assessment    patient cleared for discharge home  hydration encouraged  labor precautions reviewed  fetal kick counts emphasized  patient educated to return in contractions begin to occur again with increased frequency and intensity    D/w Dr. So

## 2020-02-25 NOTE — ED ADULT TRIAGE NOTE - CHIEF COMPLAINT QUOTE
alert received as notification 8 months pregnant c/o contractions every 6 minutes  had previous still birth  being evaluated by Dr Mina at present  OK for transport to [facility] and [facility] sent with EMS and ED tech

## 2020-02-25 NOTE — OB PROVIDER TRIAGE NOTE - HISTORY OF PRESENT ILLNESS
24y.o.  Amber  at 33.6w presenting with complaints of irregular uterine contractions since 1830 today occurring q6-8 minutes 7/10 pain.  Patient reports positive fetal movement, denies leakage of fluid, denies vaginal bleeding.    a/p course complications patient denies  patient reports high risk care with this pregnancy due to previous stillbirth at 21weeks in 2018

## 2020-02-26 VITALS — DIASTOLIC BLOOD PRESSURE: 54 MMHG | SYSTOLIC BLOOD PRESSURE: 96 MMHG | HEART RATE: 103 BPM

## 2020-03-31 ENCOUNTER — OUTPATIENT (OUTPATIENT)
Dept: INPATIENT UNIT | Facility: HOSPITAL | Age: 25
LOS: 1 days | Discharge: HOME | End: 2020-03-31
Payer: MEDICAID

## 2020-03-31 VITALS
TEMPERATURE: 98 F | SYSTOLIC BLOOD PRESSURE: 118 MMHG | DIASTOLIC BLOOD PRESSURE: 81 MMHG | RESPIRATION RATE: 18 BRPM | HEART RATE: 97 BPM

## 2020-03-31 VITALS — DIASTOLIC BLOOD PRESSURE: 81 MMHG | HEART RATE: 97 BPM | SYSTOLIC BLOOD PRESSURE: 118 MMHG

## 2020-03-31 DIAGNOSIS — Z3A.00 WEEKS OF GESTATION OF PREGNANCY NOT SPECIFIED: ICD-10-CM

## 2020-03-31 DIAGNOSIS — O26.899 OTHER SPECIFIED PREGNANCY RELATED CONDITIONS, UNSPECIFIED TRIMESTER: ICD-10-CM

## 2020-03-31 DIAGNOSIS — N75.0 CYST OF BARTHOLIN'S GLAND: Chronic | ICD-10-CM

## 2020-03-31 PROCEDURE — 99215 OFFICE O/P EST HI 40 MIN: CPT | Mod: 25

## 2020-03-31 PROCEDURE — 76818 FETAL BIOPHYS PROFILE W/NST: CPT | Mod: 26

## 2020-03-31 RX ORDER — ALBUTEROL 90 UG/1
3 AEROSOL, METERED ORAL
Qty: 0 | Refills: 0 | DISCHARGE

## 2020-03-31 RX ORDER — BENZOYL PEROXIDE MICRONIZED 5.8 %
1 TOWELETTE (EA) TOPICAL
Qty: 0 | Refills: 0 | DISCHARGE

## 2020-03-31 NOTE — OB PROVIDER TRIAGE NOTE - NS_OBGYNHISTORY_OBGYN_ALL_OB_FT
2016- FT uncomp   wk IUFD  --------------  Gyn-HSV with last outbreak in beginning of preg        -Bartholin cyst with I&D

## 2020-03-31 NOTE — OB PROVIDER TRIAGE NOTE - NSOBPROVIDERNOTE_OBGYN_ALL_OB_FT
25yo -American female  @ 38.6 wks SLIUP with hx subchorionic hematoma in beginning here for rule out ROM and labor  -SSE is negative for ROM; no evidence of labor  -pt was dc with Dr So. Pt was dc home with isstructions

## 2020-03-31 NOTE — OB PROVIDER TRIAGE NOTE - NSHPPHYSICALEXAM_GEN_ALL_CORE
Gen-A&O x 3; NAD  Vitals: BP-118/81; P-97; T-36.7    Pulm-CTA B/L; no wheezes/ rales/ ronchi  Cor-Clear S1S2; No murmurs  Abd exam-soft and nontender; gravid    SSE-os appears FT; neg pooling/ferning/ nitrazine. No signs of HSV lesions  VE-0.5/70/-2  TAS-fundal placenta; vtx; SHANTELL-18.75; 3322g; 8/8 BPP    NST cat I with 140 baseline and accels and mod variability; no ctx's

## 2020-03-31 NOTE — OB PROVIDER TRIAGE NOTE - HISTORY OF PRESENT ILLNESS
23yo -American female  @ 38.6 wks SLIUP with hx subchorionic hematoma in beginning of pregnancy, here complaining of fluid leaking x 1 yesterday with pressure and irreg mild ctx's.    Pmhx-denies  Pshx./Hosp-denies  Meds-PNV; iron; valtrex  Gyn-HSV + with last outbreak in beginning of pregnancy  Past [id number] FT uncomp               2018-22 wk IUFD

## 2020-04-02 NOTE — OB RN TRIAGE NOTE - GRAVIDA, OB PROFILE
The daughter has been made aware that [employer] will require a visit specifically for medical necessity of O2 between may-aug. 2020. The patient's daughter has been transferred to the PSRs for scheduling.    3

## 2020-04-06 ENCOUNTER — TRANSCRIPTION ENCOUNTER (OUTPATIENT)
Age: 25
End: 2020-04-06

## 2020-04-06 ENCOUNTER — INPATIENT (INPATIENT)
Facility: HOSPITAL | Age: 25
LOS: 1 days | Discharge: HOME | End: 2020-04-08
Attending: OBSTETRICS & GYNECOLOGY | Admitting: OBSTETRICS & GYNECOLOGY

## 2020-04-06 VITALS
TEMPERATURE: 98 F | DIASTOLIC BLOOD PRESSURE: 56 MMHG | RESPIRATION RATE: 17 BRPM | SYSTOLIC BLOOD PRESSURE: 118 MMHG | HEART RATE: 100 BPM

## 2020-04-06 DIAGNOSIS — N75.0 CYST OF BARTHOLIN'S GLAND: Chronic | ICD-10-CM

## 2020-04-06 DIAGNOSIS — Z3A.00 WEEKS OF GESTATION OF PREGNANCY NOT SPECIFIED: ICD-10-CM

## 2020-04-06 DIAGNOSIS — O26.899 OTHER SPECIFIED PREGNANCY RELATED CONDITIONS, UNSPECIFIED TRIMESTER: ICD-10-CM

## 2020-04-06 LAB
BASOPHILS # BLD AUTO: 0.01 K/UL — SIGNIFICANT CHANGE UP (ref 0–0.2)
BASOPHILS NFR BLD AUTO: 0.1 % — SIGNIFICANT CHANGE UP (ref 0–2)
BLD GP AB SCN SERPL QL: NEGATIVE — SIGNIFICANT CHANGE UP
EOSINOPHIL # BLD AUTO: 0.09 K/UL — SIGNIFICANT CHANGE UP (ref 0–0.5)
EOSINOPHIL NFR BLD AUTO: 1.2 % — SIGNIFICANT CHANGE UP (ref 0–6)
HCT VFR BLD CALC: 28.5 % — LOW (ref 34.5–45)
HGB BLD-MCNC: 8.6 G/DL — LOW (ref 11.5–15.5)
IMM GRANULOCYTES NFR BLD AUTO: 0.4 % — SIGNIFICANT CHANGE UP (ref 0–1.5)
LYMPHOCYTES # BLD AUTO: 2.22 K/UL — SIGNIFICANT CHANGE UP (ref 1–3.3)
LYMPHOCYTES # BLD AUTO: 30.2 % — SIGNIFICANT CHANGE UP (ref 13–44)
MCHC RBC-ENTMCNC: 23.8 PG — LOW (ref 27–34)
MCHC RBC-ENTMCNC: 30.2 % — LOW (ref 32–36)
MCV RBC AUTO: 78.9 FL — LOW (ref 80–100)
MONOCYTES # BLD AUTO: 0.69 K/UL — SIGNIFICANT CHANGE UP (ref 0–0.9)
MONOCYTES NFR BLD AUTO: 9.4 % — SIGNIFICANT CHANGE UP (ref 2–14)
NEUTROPHILS # BLD AUTO: 4.31 K/UL — SIGNIFICANT CHANGE UP (ref 1.8–7.4)
NEUTROPHILS NFR BLD AUTO: 58.7 % — SIGNIFICANT CHANGE UP (ref 43–77)
NRBC # FLD: 0 K/UL — SIGNIFICANT CHANGE UP (ref 0–0)
PLATELET # BLD AUTO: 198 K/UL — SIGNIFICANT CHANGE UP (ref 150–400)
PMV BLD: 10.7 FL — SIGNIFICANT CHANGE UP (ref 7–13)
RBC # BLD: 3.61 M/UL — LOW (ref 3.8–5.2)
RBC # FLD: 14.6 % — HIGH (ref 10.3–14.5)
RH IG SCN BLD-IMP: POSITIVE — SIGNIFICANT CHANGE UP
RH IG SCN BLD-IMP: POSITIVE — SIGNIFICANT CHANGE UP
T PALLIDUM AB TITR SER: NEGATIVE — SIGNIFICANT CHANGE UP
WBC # BLD: 7.35 K/UL — SIGNIFICANT CHANGE UP (ref 3.8–10.5)
WBC # FLD AUTO: 7.35 K/UL — SIGNIFICANT CHANGE UP (ref 3.8–10.5)

## 2020-04-06 RX ORDER — MAGNESIUM HYDROXIDE 400 MG/1
30 TABLET, CHEWABLE ORAL
Refills: 0 | Status: DISCONTINUED | OUTPATIENT
Start: 2020-04-06 | End: 2020-04-08

## 2020-04-06 RX ORDER — HYDROCORTISONE 1 %
1 OINTMENT (GRAM) TOPICAL EVERY 6 HOURS
Refills: 0 | Status: DISCONTINUED | OUTPATIENT
Start: 2020-04-06 | End: 2020-04-08

## 2020-04-06 RX ORDER — DIPHENHYDRAMINE HCL 50 MG
25 CAPSULE ORAL EVERY 6 HOURS
Refills: 0 | Status: DISCONTINUED | OUTPATIENT
Start: 2020-04-06 | End: 2020-04-08

## 2020-04-06 RX ORDER — IBUPROFEN 200 MG
1 TABLET ORAL
Qty: 0 | Refills: 0 | DISCHARGE
Start: 2020-04-06

## 2020-04-06 RX ORDER — OXYCODONE HYDROCHLORIDE 5 MG/1
5 TABLET ORAL ONCE
Refills: 0 | Status: DISCONTINUED | OUTPATIENT
Start: 2020-04-06 | End: 2020-04-08

## 2020-04-06 RX ORDER — OXYTOCIN 10 UNIT/ML
333.33 VIAL (ML) INJECTION
Qty: 20 | Refills: 0 | Status: DISCONTINUED | OUTPATIENT
Start: 2020-04-06 | End: 2020-04-08

## 2020-04-06 RX ORDER — SODIUM CHLORIDE 9 MG/ML
1000 INJECTION, SOLUTION INTRAVENOUS
Refills: 0 | Status: DISCONTINUED | OUTPATIENT
Start: 2020-04-06 | End: 2020-04-06

## 2020-04-06 RX ORDER — AER TRAVELER 0.5 G/1
1 SOLUTION RECTAL; TOPICAL EVERY 4 HOURS
Refills: 0 | Status: DISCONTINUED | OUTPATIENT
Start: 2020-04-06 | End: 2020-04-08

## 2020-04-06 RX ORDER — PRAMOXINE HYDROCHLORIDE 150 MG/15G
1 AEROSOL, FOAM RECTAL EVERY 4 HOURS
Refills: 0 | Status: DISCONTINUED | OUTPATIENT
Start: 2020-04-06 | End: 2020-04-08

## 2020-04-06 RX ORDER — VALACYCLOVIR 500 MG/1
1 TABLET, FILM COATED ORAL
Qty: 0 | Refills: 0 | DISCHARGE

## 2020-04-06 RX ORDER — ACETAMINOPHEN 500 MG
975 TABLET ORAL
Refills: 0 | Status: DISCONTINUED | OUTPATIENT
Start: 2020-04-06 | End: 2020-04-08

## 2020-04-06 RX ORDER — BENZOCAINE 10 %
1 GEL (GRAM) MUCOUS MEMBRANE EVERY 6 HOURS
Refills: 0 | Status: DISCONTINUED | OUTPATIENT
Start: 2020-04-06 | End: 2020-04-08

## 2020-04-06 RX ORDER — TETANUS TOXOID, REDUCED DIPHTHERIA TOXOID AND ACELLULAR PERTUSSIS VACCINE, ADSORBED 5; 2.5; 8; 8; 2.5 [IU]/.5ML; [IU]/.5ML; UG/.5ML; UG/.5ML; UG/.5ML
0.5 SUSPENSION INTRAMUSCULAR ONCE
Refills: 0 | Status: DISCONTINUED | OUTPATIENT
Start: 2020-04-06 | End: 2020-04-08

## 2020-04-06 RX ORDER — DIBUCAINE 1 %
1 OINTMENT (GRAM) RECTAL EVERY 6 HOURS
Refills: 0 | Status: DISCONTINUED | OUTPATIENT
Start: 2020-04-06 | End: 2020-04-08

## 2020-04-06 RX ORDER — KETOROLAC TROMETHAMINE 30 MG/ML
30 SYRINGE (ML) INJECTION ONCE
Refills: 0 | Status: DISCONTINUED | OUTPATIENT
Start: 2020-04-06 | End: 2020-04-06

## 2020-04-06 RX ORDER — IBUPROFEN 200 MG
600 TABLET ORAL EVERY 6 HOURS
Refills: 0 | Status: COMPLETED | OUTPATIENT
Start: 2020-04-06 | End: 2021-03-05

## 2020-04-06 RX ORDER — SODIUM CHLORIDE 9 MG/ML
3 INJECTION INTRAMUSCULAR; INTRAVENOUS; SUBCUTANEOUS EVERY 8 HOURS
Refills: 0 | Status: DISCONTINUED | OUTPATIENT
Start: 2020-04-06 | End: 2020-04-08

## 2020-04-06 RX ORDER — IBUPROFEN 200 MG
600 TABLET ORAL EVERY 6 HOURS
Refills: 0 | Status: DISCONTINUED | OUTPATIENT
Start: 2020-04-06 | End: 2020-04-08

## 2020-04-06 RX ORDER — LANOLIN
1 OINTMENT (GRAM) TOPICAL EVERY 6 HOURS
Refills: 0 | Status: DISCONTINUED | OUTPATIENT
Start: 2020-04-06 | End: 2020-04-08

## 2020-04-06 RX ORDER — FAMOTIDINE 10 MG/ML
1 INJECTION INTRAVENOUS
Qty: 0 | Refills: 0 | DISCHARGE

## 2020-04-06 RX ORDER — GLYCERIN ADULT
1 SUPPOSITORY, RECTAL RECTAL AT BEDTIME
Refills: 0 | Status: DISCONTINUED | OUTPATIENT
Start: 2020-04-06 | End: 2020-04-08

## 2020-04-06 RX ORDER — OXYCODONE HYDROCHLORIDE 5 MG/1
5 TABLET ORAL
Refills: 0 | Status: DISCONTINUED | OUTPATIENT
Start: 2020-04-06 | End: 2020-04-08

## 2020-04-06 RX ORDER — SIMETHICONE 80 MG/1
80 TABLET, CHEWABLE ORAL EVERY 4 HOURS
Refills: 0 | Status: DISCONTINUED | OUTPATIENT
Start: 2020-04-06 | End: 2020-04-08

## 2020-04-06 RX ADMIN — Medication 1000 MILLIUNIT(S)/MIN: at 08:14

## 2020-04-06 RX ADMIN — SODIUM CHLORIDE 3 MILLILITER(S): 9 INJECTION INTRAMUSCULAR; INTRAVENOUS; SUBCUTANEOUS at 22:55

## 2020-04-06 RX ADMIN — Medication 975 MILLIGRAM(S): at 18:14

## 2020-04-06 RX ADMIN — Medication 30 MILLIGRAM(S): at 08:12

## 2020-04-06 RX ADMIN — Medication 975 MILLIGRAM(S): at 11:15

## 2020-04-06 RX ADMIN — Medication 975 MILLIGRAM(S): at 22:47

## 2020-04-06 NOTE — DISCHARGE NOTE OB - CARE PROVIDER_API CALL
Chapis Cruz (MD)  Obstetrics and Gynecology  [address] [place] [address]  Phone: [phone]  Fax: [phone]  Established Patient  Follow Up Time: Routine

## 2020-04-06 NOTE — PROVIDER CONTACT NOTE (OTHER) - ASSESSMENT
Patient's HR increased from 68 to 94 from sitting to standing position. BP WNL. Patient asymptomatic, denies dizziness, lightheadedness, or Nausea.

## 2020-04-06 NOTE — OB PROVIDER DELIVERY SUMMARY - NSPROVIDERDELIVERYNOTE_OBGYN_ALL_OB_FT
Delivered per vagina over intact perineum liveborn male infant apgar 8/8. Delayed cord clamping.  Placenta delivered intact.  Uterus firm.  Mother and baby stable.  Peds called for precipitous delivery

## 2020-04-06 NOTE — OB RN DELIVERY SUMMARY - NS_SEPSISRSKCALC_OBGYN_ALL_OB_FT
EOS calculated successfully. EOS Risk Factor: 0.5/1000 live births ([facility] national incidence); GA=39w5d; Temp=97.9; ROM=0.45; GBS='Negative'; Antibiotics='No antibiotics or any antibiotics < 2 hrs prior to birth'

## 2020-04-06 NOTE — OB NEONATOLOGY/PEDIATRICIAN DELIVERY SUMMARY - NSPEDSNEONOTESA_OBGYN_ALL_OB_FT
Baby is a 39.5 week GA M born to a 23 y/o  mother via . Maternal history uncomplicated. Pregnancy uncomplicated. Maternal blood A+. Prenatal labs neg/NR/I. GBS neg on 3/10. SROM @0646 on  with clear fluid. Baby born vigorous and crying spontaneously. Warmed, dried, stimulated. Apgars 8/8. EOS 0.04.

## 2020-04-06 NOTE — DISCHARGE NOTE OB - CARE PLAN
Principal Discharge DX:	 (normal spontaneous vaginal delivery)  Goal:	normal postpartum course  Assessment and plan of treatment:	nothing in vagina  Secondary Diagnosis:	Labor without complication  Secondary Diagnosis:	Asthma  Secondary Diagnosis:	Herpes

## 2020-04-06 NOTE — OB PROVIDER TRIAGE NOTE - NS_BABIESUTERO_OBGYN_ALL_OB_NU
Patient given 16 oz of vanilla boost (28 grams of fat) by mouth for a nuclear medicine HIDA scan w EF.   
1

## 2020-04-06 NOTE — OB PROVIDER TRIAGE NOTE - HISTORY OF PRESENT ILLNESS
24y.o.   at 39.5w presenting with complaints of irregular uterine contractions since 0430 today.  Patient reports pain is presently 10/10 and is requesting pain management. Patient reports positive fetal movement, denies leakage of fluid, denies vaginal bleeding.    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

## 2020-04-06 NOTE — DISCHARGE NOTE OB - PATIENT PORTAL LINK FT
You can access the FollowMyHealth Patient Portal offered by [facility] by registering at the following website: [email or web address]. By joining [facility]’s FollowMyHealth portal, you will also be able to view your health information using other applications (apps) compatible with our system.

## 2020-04-06 NOTE — OB PROVIDER TRIAGE NOTE - NSHPPHYSICALEXAM_GEN_ALL_CORE
Vital Signs Last 24 Hrs  T(C): 36.6 (06 Apr 2020 05:39), Max: 36.6 (06 Apr 2020 05:39)  T(F): 97.9 (06 Apr 2020 05:39), Max: 97.9 (06 Apr 2020 05:39)  HR: 100 (06 Apr 2020 05:48) (100 - 100)  BP: 118/56 (06 Apr 2020 05:48) (118/56 - 118/56)  BP(mean): --  RR: 17 (06 Apr 2020 05:39) (17 - 17)  SpO2: --    regular heart rate; rhythm   lungs CTA     abdomen soft nontender gravid  (+) FHR; moderate variability; with accelerations  irregular uterine contractions noted on tocometer    Sterile Speculum Exam: no evidence of HSV lesions   Vaginal Exam: 5/80/-3; bulging membranes     Cephalic presentation

## 2020-04-06 NOTE — OB RN TRIAGE NOTE - PMH
Asthma  Last attack last year   (normal spontaneous vaginal delivery)  FT 7/15/16  Fetal demise @21 wks 18 Asthma  Last attack last year  Herpes     (normal spontaneous vaginal delivery)  FT 7/15/16  Fetal demise @21 wks 18

## 2020-04-06 NOTE — OB PROVIDER H&P - ASSESSMENT
pmh: asthma; denies hospitalizations or intubations  psh: I&D 2016  obhx: -2016; 21w IUFD  -2016 7LBS 6OZ  gynhx: HSV 2,  bartholin cysts     NKDA    Medications  PNV  Valtrex  Albuterol PRN    Assessment  Vital Signs Last 24 Hrs  T(C): 36.6 (2020 05:39), Max: 36.6 (2020 05:39)  T(F): 97.9 (2020 05:39), Max: 97.9 (2020 05:39)  HR: 100 (2020 05:48) (100 - 100)  BP: 118/56 (2020 05:48) (118/56 - 118/56)  BP(mean): --  RR: 17 (2020 05:39) (17 - 17)  SpO2: --    regular heart rate; rhythm   lungs CTA     abdomen soft nontender gravid  (+) FHR; moderate variability; with accelerations  irregular uterine contractions noted on tocometer    Sterile Speculum Exam: no evidence of HSV lesions   patient appears grossly ruptures  (+) nitrazine, (+) ferning, (+) ferning  Vaginal Exam: 6.5/80/-3; bulging membranes     Cephalic presentation    COVID-19 Assessment  patient denies cough, fever, chest discomfort, sick contacts  patient denies any recent covid testing     Plan  Admit to Labor and Delivery for Labor at Term  Routine Admission Labs ordered   COVID-19 Culture Pending  Epidural for Pain Management  Expectant Management  Continuos EFM & Holly Monitoring     D/w Dr. So & Dr. Brock PGY-3

## 2020-04-06 NOTE — OB RN PATIENT PROFILE - EDUCATION OF PARENTS ON THE BENEFITS OF SKIN TO SKIN AND BREASTFEEDING TO BOTH MOTHER AND INFANT.
[facility] form signed by Dr Buck Bangura, and was faxed to [facility] Statement Selected

## 2020-04-07 LAB — SARS-COV-2 RNA SPEC QL NAA+PROBE: SIGNIFICANT CHANGE UP

## 2020-04-07 RX ADMIN — SODIUM CHLORIDE 3 MILLILITER(S): 9 INJECTION INTRAMUSCULAR; INTRAVENOUS; SUBCUTANEOUS at 14:00

## 2020-04-07 RX ADMIN — SODIUM CHLORIDE 3 MILLILITER(S): 9 INJECTION INTRAMUSCULAR; INTRAVENOUS; SUBCUTANEOUS at 23:42

## 2020-04-07 RX ADMIN — Medication 600 MILLIGRAM(S): at 17:17

## 2020-04-07 RX ADMIN — SODIUM CHLORIDE 3 MILLILITER(S): 9 INJECTION INTRAMUSCULAR; INTRAVENOUS; SUBCUTANEOUS at 06:34

## 2020-04-07 RX ADMIN — Medication 1 TABLET(S): at 17:23

## 2020-04-07 RX ADMIN — Medication 975 MILLIGRAM(S): at 17:17

## 2020-04-07 NOTE — PROGRESS NOTE ADULT - SUBJECTIVE AND OBJECTIVE BOX
Subjective  Pain: well controlled  Complaints: none  Milestones: Alert and orientedx3. Out of bed ambulating. Voiding/Due to void. Tolerating a regular diet.  Infant feeding:      breast                           Feeding related issues or concerns:none    Objective   T(C): 36.5 (04-07-20 @ 06:21), Max: 36.8 (04-06-20 @ 17:15)  HR: 67 (04-07-20 @ 06:21) (67 - 77)  BP: 111/60 (04-07-20 @ 06:21) (111/60 - 125/65)  RR: 17 (04-07-20 @ 06:21) (16 - 19)  SpO2: 100% (04-07-20 @ 06:21) (100% - 100%)  Wt(kg): --      Assessment      25 y/o G 3 P2  .Day # 1 postpartum.  Assisted delivery (vacuum, forceps):n/a  Indication for assisted delivery:  Past medical history significant fornone  Current Issues: none  Breasts:soft  Abdomen: Soft, nontender, nondistended.  Vagina: Lochia moderate  Perineum:  intact  Lower extremities: No edema noted bilaterally of lower extremities. Nontender calves.  Negative Leydi's sign. Positive pedal pulses.  Other relevant physical exam findings;none      Plan  Plan: Increase ambulation, analgesia PRN and pain medication protocal standing oxycodone, ibuprofen and acetaminophen.  Diet: Continue regular diet  Continue routine postpartum care.

## 2020-04-08 VITALS
RESPIRATION RATE: 18 BRPM | SYSTOLIC BLOOD PRESSURE: 111 MMHG | DIASTOLIC BLOOD PRESSURE: 57 MMHG | OXYGEN SATURATION: 100 % | TEMPERATURE: 98 F | HEART RATE: 67 BPM

## 2020-04-08 RX ADMIN — Medication 600 MILLIGRAM(S): at 02:29

## 2020-04-08 RX ADMIN — Medication 975 MILLIGRAM(S): at 06:07

## 2020-04-08 NOTE — PROGRESS NOTE ADULT - SUBJECTIVE AND OBJECTIVE BOX
Subjective  Pain: well controlled  Complaints:none  Milestones: Alert and orientedx3. Out of bed ambulating. Voiding/Due to void. Tolerating a regular diet.  Infant feeding:    breast                             Feeding related issues or concerns:none    Objective   T(C): 36.9 (04-08-20 @ 06:19), Max: 37.1 (04-07-20 @ 18:00)  HR: 67 (04-08-20 @ 06:19) (67 - 84)  BP: 111/57 (04-08-20 @ 06:19) (111/57 - 120/66)  RR: 18 (04-08-20 @ 06:19) (18 - 20)  SpO2: 100% (04-08-20 @ 06:19) (100% - 100%)  Wt(kg): --      Assessment      25 y/o G 3 P 2 .Day # 2 postpartum.  Assisted delivery (vacuum, forceps):n/a  Indication for assisted delivery:  Past medical history significant for none  Current Issues: none  Breasts:soft  Abdomen: Soft, nontender, nondistended.  Vagina: Lochia moderate  Perineum:  intact   Lower extremities: No edema noted bilaterally of lower extremities. Nontender calves.  Negative Leydi's sign. Positive pedal pulses.  Other relevant physical exam findings; none      Plan  Plan: Increase ambulation, analgesia PRN and pain medication protocal standing oxycodone, ibuprofen and acetaminophen.  Diet: Continue regular diet  Continue routine postpartum care.   d/c home

## 2020-07-06 ENCOUNTER — FORM ENCOUNTER (OUTPATIENT)
Age: 25
End: 2020-07-06

## 2021-01-28 ENCOUNTER — EMERGENCY (EMERGENCY)
Facility: HOSPITAL | Age: 26
LOS: 1 days | Discharge: HOME | End: 2021-01-28
Attending: EMERGENCY MEDICINE | Admitting: EMERGENCY MEDICINE
Payer: MEDICAID

## 2021-01-28 VITALS
SYSTOLIC BLOOD PRESSURE: 128 MMHG | OXYGEN SATURATION: 97 % | DIASTOLIC BLOOD PRESSURE: 79 MMHG | RESPIRATION RATE: 16 BRPM | TEMPERATURE: 98 F | HEIGHT: 65 IN | HEART RATE: 58 BPM

## 2021-01-28 VITALS
SYSTOLIC BLOOD PRESSURE: 108 MMHG | HEART RATE: 60 BPM | RESPIRATION RATE: 18 BRPM | TEMPERATURE: 98 F | DIASTOLIC BLOOD PRESSURE: 67 MMHG | OXYGEN SATURATION: 100 %

## 2021-01-28 DIAGNOSIS — N75.0 CYST OF BARTHOLIN'S GLAND: Chronic | ICD-10-CM

## 2021-01-28 PROBLEM — B00.9 HERPESVIRAL INFECTION, UNSPECIFIED: Chronic | Status: ACTIVE | Noted: 2020-04-06

## 2021-01-28 LAB
ANION GAP SERPL CALC-SCNC: 10 MMOL/L — SIGNIFICANT CHANGE UP (ref 7–14)
APPEARANCE UR: CLEAR — SIGNIFICANT CHANGE UP
BASOPHILS # BLD AUTO: 0.02 K/UL — SIGNIFICANT CHANGE UP (ref 0–0.2)
BASOPHILS NFR BLD AUTO: 0.5 % — SIGNIFICANT CHANGE UP (ref 0–2)
BILIRUB UR-MCNC: NEGATIVE — SIGNIFICANT CHANGE UP
BLD GP AB SCN SERPL QL: NEGATIVE — SIGNIFICANT CHANGE UP
BUN SERPL-MCNC: 14 MG/DL — SIGNIFICANT CHANGE UP (ref 7–23)
CALCIUM SERPL-MCNC: 8.4 MG/DL — SIGNIFICANT CHANGE UP (ref 8.4–10.5)
CHLORIDE SERPL-SCNC: 103 MMOL/L — SIGNIFICANT CHANGE UP (ref 98–107)
CO2 SERPL-SCNC: 25 MMOL/L — SIGNIFICANT CHANGE UP (ref 22–31)
COLOR SPEC: YELLOW — SIGNIFICANT CHANGE UP
CREAT SERPL-MCNC: 0.53 MG/DL — SIGNIFICANT CHANGE UP (ref 0.5–1.3)
DIFF PNL FLD: ABNORMAL
EOSINOPHIL # BLD AUTO: 0.2 K/UL — SIGNIFICANT CHANGE UP (ref 0–0.5)
EOSINOPHIL NFR BLD AUTO: 4.7 % — SIGNIFICANT CHANGE UP (ref 0–6)
GLUCOSE SERPL-MCNC: 93 MG/DL — SIGNIFICANT CHANGE UP (ref 70–99)
GLUCOSE UR QL: NEGATIVE — SIGNIFICANT CHANGE UP
HCG SERPL-ACNC: 170.2 MIU/ML — SIGNIFICANT CHANGE UP
HCT VFR BLD CALC: 36.9 % — SIGNIFICANT CHANGE UP (ref 34.5–45)
HGB BLD-MCNC: 11.8 G/DL — SIGNIFICANT CHANGE UP (ref 11.5–15.5)
IANC: 1.76 K/UL — SIGNIFICANT CHANGE UP (ref 1.5–8.5)
IMM GRANULOCYTES NFR BLD AUTO: 0.5 % — SIGNIFICANT CHANGE UP (ref 0–1.5)
KETONES UR-MCNC: NEGATIVE — SIGNIFICANT CHANGE UP
LEUKOCYTE ESTERASE UR-ACNC: NEGATIVE — SIGNIFICANT CHANGE UP
LYMPHOCYTES # BLD AUTO: 1.92 K/UL — SIGNIFICANT CHANGE UP (ref 1–3.3)
LYMPHOCYTES # BLD AUTO: 45.2 % — HIGH (ref 13–44)
MCHC RBC-ENTMCNC: 28.5 PG — SIGNIFICANT CHANGE UP (ref 27–34)
MCHC RBC-ENTMCNC: 32 GM/DL — SIGNIFICANT CHANGE UP (ref 32–36)
MCV RBC AUTO: 89.1 FL — SIGNIFICANT CHANGE UP (ref 80–100)
MONOCYTES # BLD AUTO: 0.33 K/UL — SIGNIFICANT CHANGE UP (ref 0–0.9)
MONOCYTES NFR BLD AUTO: 7.8 % — SIGNIFICANT CHANGE UP (ref 2–14)
NEUTROPHILS # BLD AUTO: 1.76 K/UL — LOW (ref 1.8–7.4)
NEUTROPHILS NFR BLD AUTO: 41.3 % — LOW (ref 43–77)
NITRITE UR-MCNC: NEGATIVE — SIGNIFICANT CHANGE UP
NRBC # BLD: 0 /100 WBCS — SIGNIFICANT CHANGE UP
NRBC # FLD: 0 K/UL — SIGNIFICANT CHANGE UP
PH UR: 7 — SIGNIFICANT CHANGE UP (ref 5–8)
PLATELET # BLD AUTO: 243 K/UL — SIGNIFICANT CHANGE UP (ref 150–400)
POTASSIUM SERPL-MCNC: 3.4 MMOL/L — LOW (ref 3.5–5.3)
POTASSIUM SERPL-SCNC: 3.4 MMOL/L — LOW (ref 3.5–5.3)
PROT UR-MCNC: ABNORMAL
RBC # BLD: 4.14 M/UL — SIGNIFICANT CHANGE UP (ref 3.8–5.2)
RBC # FLD: 12 % — SIGNIFICANT CHANGE UP (ref 10.3–14.5)
RH IG SCN BLD-IMP: POSITIVE — SIGNIFICANT CHANGE UP
SARS-COV-2 RNA SPEC QL NAA+PROBE: SIGNIFICANT CHANGE UP
SODIUM SERPL-SCNC: 138 MMOL/L — SIGNIFICANT CHANGE UP (ref 135–145)
SP GR SPEC: 1.04 — HIGH (ref 1.01–1.02)
UROBILINOGEN FLD QL: ABNORMAL
WBC # BLD: 4.25 K/UL — SIGNIFICANT CHANGE UP (ref 3.8–10.5)
WBC # FLD AUTO: 4.25 K/UL — SIGNIFICANT CHANGE UP (ref 3.8–10.5)

## 2021-01-28 PROCEDURE — 76817 TRANSVAGINAL US OBSTETRIC: CPT | Mod: 26

## 2021-01-28 PROCEDURE — 99285 EMERGENCY DEPT VISIT HI MDM: CPT

## 2021-01-28 RX ORDER — MISOPROSTOL 200 UG/1
3 TABLET ORAL
Qty: 3 | Refills: 0
Start: 2021-01-28

## 2021-01-28 NOTE — ED ADULT NURSE REASSESSMENT NOTE - NS ED NURSE REASSESS COMMENT FT1
Patient verbalizes understanding of discharge instructions and follow up care, information given by Dr. Odonnell . IV discontinued bleeding stopped bandage applied. Patient ambulates steady gait, breathing even unlabored. patient exited ED to waiting area safety maintained.

## 2021-01-28 NOTE — CONSULT NOTE ADULT - SUBJECTIVE AND OBJECTIVE BOX
GYN Consult Note    26 y/o  LMP  presenting with vaginal spotting that started yesterday. Patient is pregnant, and last night noticed onset of vaginal spotting with passage of pink tissue around 2am. Also has lower abdominal 8/10 cramping. Feels slightly dizzy/lightheaded with a mild headache. No n/v, chest pain, SOB, fevers/chills, or urinary symptoms.    OB: Dr. Cruz, had first prenatal visit scheduled for     OBHx:  G1  FT   G2 2018w fetal demise VD  G3  FT   GYNHx: h/o herpes (valtrex prn), denies abnormal paps, fibroids, or cysts  PMH: asthma  PSH: 2016 drainage of bartholin gland  Meds: multivitamin  All: NKDA  SH: denies daily alcohol, tobacco, or drug use  Psych: denies h/o anxiety or depression      Vital Signs Last 24 Hrs  T(C): 36.9 (2021 15:01), Max: 36.9 (2021 15:01)  T(F): 98.4 (2021 15:01), Max: 98.4 (2021 15:01)  HR: 61 (2021 15:01) (58 - 61)  BP: 117/55 (2021 15:01) (117/55 - 128/79)  BP(mean): --  RR: 18 (2021 15:01) (16 - 18)  SpO2: 100% (2021 15:01) (97% - 100%)    PHYSICAL EXAM:   Gen: NAD  Cardiovascular: regular   Respiratory: breathing comfortably on RA  Abd: soft, tenderness with deep palpation of lower abdomen, non distended  Speculum: minimal blood in vaginal vault; cervical os closed  Bimanual: mild uterine tenderness, no CMT or adenxal tenderness  Ext: no edema or tenderness      LABS:                        11.8   4.25  )-----------( 243      ( 2021 13:47 )             36.9         138  |  103  |  14  ----------------------------<  93  3.4<L>   |  25  |  0.53    Ca    8.4      2021 13:47    [facility]: 170      TVUS:   INTERPRETATION:  Transabdominal and Endovaginal Grayscale imaging of the uterus, ovaries, and adnexa performed.  The uterus was empty.  The left ovary was visualized and measured 2.26 cm x 2.62 cm.  A corpus luteal cyst was visualized in the left ovary.  The right ovary was visualized and measured 1.87 cm x 3.30 cm.  Both ovaries were of normal echotexture.  No masses were visualized in bilateral adnexas.  The left ovary demonstrated normal arterial and venous blood flow.  The right ovary demonstrated normal arterial and venous blood flow.  There was no free fluid present.    IMPRESSION:  Pregnancy of unknown location.  Cannot exclude early pregnancy vs. early ectopic vs. complete  without a prior ultrasound and a positive beta Hcg. GYN Consult Note    26 y/o  LMP  presenting with vaginal spotting that started yesterday. Patient is pregnant, and last night noticed onset of vaginal spotting with passage of pink tissue around 2am. Also has lower abdominal 8/10 cramping. Feels slightly dizzy/lightheaded with a mild headache. No n/v, chest pain, SOB, fevers/chills, or urinary symptoms.    OB: Dr. Cruz, had first prenatal visit scheduled for     OBHx:  G1  FT   G2 2018w fetal demise VD  G3  FT   GYNHx: h/o herpes (valtrex prn), denies abnormal paps, fibroids, or cysts  PMH: asthma  PSH: 2016 drainage of bartholin gland  Meds: multivitamin  All: NKDA  SH: denies daily alcohol, tobacco, or drug use  Psych: denies h/o anxiety or depression      Vital Signs Last 24 Hrs  T(C): 36.9 (2021 15:01), Max: 36.9 (2021 15:01)  T(F): 98.4 (2021 15:01), Max: 98.4 (2021 15:01)  HR: 61 (2021 15:01) (58 - 61)  BP: 117/55 (2021 15:01) (117/55 - 128/79)  BP(mean): --  RR: 18 (2021 15:01) (16 - 18)  SpO2: 100% (2021 15:01) (97% - 100%)    PHYSICAL EXAM:   Gen: NAD  Cardiovascular: regular   Respiratory: breathing comfortably on RA  Abd: soft, tenderness with deep palpation of lower abdomen, non distended  Speculum: minimal blood in vaginal vault; cervical os closed  Bimanual: mild uterine tenderness, no CMT or adenxal tenderness  Ext: no edema or tenderness      LABS:                        11.8   4.25  )-----------( 243      ( 2021 13:47 )             36.9         138  |  103  |  14  ----------------------------<  93  3.4<L>   |  25  |  0.53    Ca    8.4      2021 13:47    [facility]: 170      TVUS:   INTERPRETATION:  Transabdominal and Endovaginal Grayscale imaging of the uterus, ovaries, and adnexa performed.  The uterus was empty.  The left ovary was visualized and measured 2.26 cm x 2.62 cm.  A corpus luteal cyst was visualized in the left ovary.  The right ovary was visualized and measured 1.87 cm x 3.30 cm.  Both ovaries were of normal echotexture.  No masses were visualized in bilateral adnexas.  The left ovary demonstrated normal arterial and venous blood flow.  The right ovary demonstrated normal arterial and venous blood flow.  There was no free fluid present.    IMPRESSION:  Pregnancy of unknown location.  Cannot exclude early pregnancy vs. early ectopic vs. complete  without a prior ultrasound and a positive beta Hcg.    On review with gyn team, endometrial strip around 1.2cmm, no gestational sac seen

## 2021-01-28 NOTE — ED ADULT TRIAGE NOTE - CHIEF COMPLAINT QUOTE
pt , 6 weeks pregnant (pos home pregnancy test),c/o pelvic cramping, lower back pain x 2 days. pt states passing large clot yesterday. pt denies fever.

## 2021-01-28 NOTE — ED ADULT NURSE NOTE - CHIEF COMPLAINT
Video start time: 3:06pm  Video end time: 4pm    Telemedicine Visit: The patient's condition can be safely assessed and treated via synchronous audio and visual telemedicine encounter.      Reason for Telemedicine Visit: COVID 19    Originating Site (Patient Location): Patient's home    Distant Site (Provider Location): Provider Remote Setting    Consent:  The patient/guardian has verbally consented to: the potential risks and benefits of telemedicine (video visit) versus in person care; bill my insurance or make self-payment for services provided; and responsibility for payment of non-covered services.     Mode of Communication:  Video Conference via Nursenav    As the provider I attest to compliance with applicable laws and regulations related to telemedicine.          Program [facility]  [facility]  [address], [address]  [place] [address]  Outpatient Progress Note      Session Date: 10/01/20  Client Name: Sunni Flores (she/her pronouns)  YOB: 1966  MRN: [id number]  Provider: Aicha Velasquez, PhD,   Type of Session: Individual   Present in Session: Clent only  Number of Minutes: 54min  Treatment Plan Due: 2019    Health Maintenance Summary - Mental Health Treatment Plan       Status Date      MENTAL HEALTH TX PLAN Overdue 3/22/2020      Done 2019      Done 10/5/2018 See Scan     Done 2017           Current Symptoms/Status:   Sunni reports lifelong discomfort with her sex assigned at birth, and identifies as a woman. At intake, Sunni also noted concerns about sexual functioning (erection/orgasm) though these have subsided as gender has been further explored. She indicated that, since her wife  in 2016, she increasingly desired to further explore gender concerns and has been supported in this by her current partner, Tawny. Sunni has taken steps to come out as transgender to her daughters and peer group, and has been met with support. She reports a  desire to further explore options for social and medical transition.     Progress Toward Treatment Goals:   Continues social and medical transition, planning for surgery. Working on integration of self, negative self-talk and self-compassion, excited about upcoming surgery and also apprehensive about timing and scheduling    Intervention & Response:   Interpersonal, client-centered, and CBT techniques utilized to address client's current status.     Sunni stated she has been anxious and having trouble sleeping. Stated she is anxious about her upcoming surgery.Talked about her feelings about physical pain and about being the patient and being cared for. Talked about her relationship with her genitals and making peace with the surgery. Explored her feelings about age and change and her transition.     Talked about struggles with her mother misgendering her.    Explored arousal, desire, history of trauma with pressure to perform sexually. Recommended muffing, self pleasuring, practicing being present.     Continues to work on self-compassion. Talked about coping with Covid and slowing down.     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       Plan / Need for Future Services:    Return for individual therapy in 2-3 weeks.           Aicha Velasquez, PhD, LP, CST      Video-Visit Details    Type of service:  Video Visit    Video End Time (time video stopped): 4:00pm      Sara Velasquez, PhD, LP    Coordinator, Transgender Health Services  Program in Human Sexuality, [facility]  Department of Family Medicine and [facility]  [employer]     The patient is a 25y Female complaining of vaginal bleeding.

## 2021-01-28 NOTE — ED ADULT NURSE NOTE - CAS EDN DISCHARGE ASSESSMENT
Discharged by Dr Odonnell/Alert and oriented to person, place and time/Patient baseline mental status/Awake

## 2021-01-28 NOTE — ED PROVIDER NOTE - TOBACCO USE
HISTORY OF PRESENT ILLNESS     HPI    This is a 50 year old male who presents today for follow up of his chronic medical issues.     He has diabetes mellitus type 2, hypertension, and mild hyperlipidemia. These issues basically resolved with lifestyle modifications but recurred when his lifestyle changed again and he has now developed significant retinal issues due to inconsistent use of his medications. His diabetes is currently under poor but improved control. At his last visit I advised a change from Novolog 70/30 mix at 30-40 units twice daily to Levemir starting at 20 units daily and increased by 2 units every 3 days until his fasting blood sugars are under 120. I also advised him to start Humalog with each meal starting at 1 unit/15 gm of carbohydrates plus 1 unit for every 40 points that his blood sugar is over 120. He is currently up to 30 units of Levemir daily. He is averaging 15-30 units of Humalog with meals. I stressed the need to check his blood sugars 4 times daily. I offered a referral to diabetic education, to discuss carbohydrate counting in particular, but he declined and instead took home information on the subject. I discussed continuous glucose monitors as well but he was not interested in one. He remains on metformin  mg twice daily as well. He checks blood sugars at home 4 times per day and the average is low 200s. Hypoglycemic events are never occurring.    Lab Results   Component Value Date    HGBA1C 10.0 (H) 01/12/2018    HGBA1C 7.8 (H) 06/23/2017    HGBA1C 10.7 (H) 03/03/2017    HGBA1C 11.6 (H) 11/30/2016     His last dilated eye exam was 2/9/2018 at the [facility]. There was proliferative retinopathy present. He underwent pan retinal photocoagulation and Avastin injections in both eyes as well as vitrectomy of the right eye. He still has limited vision of his right eye.    He has subjective neuropathy in his feet.    His last urine testing revealed no  microalbuminuria.    Lab Results   Component Value Date    MALBCR 9.9 01/12/2018    CREATININE 0.97 01/12/2018    CREATININE 0.92 06/23/2017    CREATININE 0.88 03/03/2017    CREATININE 0.88 11/30/2016    GFRNA >90 01/12/2018     His blood pressure is under improved control with the addition of lisinopril 10 mg twice daily. He was on losartan in the past. He felt that this caused numbness in his feet, which was more likely related to his hyperglycemia.    Blood pressure 128/83, pulse 100, resp. rate 16, height 6' 5\" (1.956 m), weight 118.8 kg.    His lipids are reasonable control without medication but he should ideally be on a statin.    Lab Results   Component Value Date    CHOLESTEROL 176 01/12/2018    HDL 47 01/12/2018    CALCLDL 109 01/12/2018    TRIGLYCERIDE 102 01/12/2018     He notes pain and decreased range of motion of his right shoulder consistent with recurrence of frozen shoulder.    Diet:  uses a calorie cathy, averages around 2000 per day  Exercise:  sedentary the last 2 months and weight is up    Immunizations:  TDaP/Td:  up to date  Pneumococcal:  up to date  Influenza:  due and refuses  Zoster:  due and will need to see if covered    Immunization History   Administered Date(s) Administered   • Pneumococcal polysaccharide, adult, 23 valent 01/12/2018   • Td:Adult type tetanus/diphtheria 10/27/2009   • Tdap 10/27/2009       PAST MEDICAL, FAMILY AND SOCIAL HISTORY     I have reviewed the patient's medications and allergies, past medical, surgical, social and family history, updating these as appropriate.  See Histories section of the EMR for a display of this information.      REVIEW OF SYSTEMS     Review of Systems   Respiratory: Negative for shortness of breath.    Cardiovascular: Negative for chest pain, palpitations and leg swelling.   Gastrointestinal: Negative for abdominal pain.   All other systems reviewed and are negative.      PHYSICAL EXAM     Physical Exam   Constitutional: He appears  well-developed and well-nourished. No distress.   Eyes: No scleral icterus.   Neck: No JVD present. Carotid bruit is not present.   Cardiovascular: Normal rate, regular rhythm and normal heart sounds.  Exam reveals no gallop and no friction rub.    No murmur heard.  Pulmonary/Chest: Effort normal and breath sounds normal. He has no wheezes. He has no rales.   Abdominal: Soft. There is no tenderness.   Musculoskeletal: He exhibits no edema.   Neurological: He is alert. He is not disoriented.   Skin: Skin is warm and dry.   Nursing note and vitals reviewed.    Diabetic Foot Exam Documentation     Abnormal Bilateral Foot Exam:   Right: Skin integrity is mildly callused without ulcerations. Dorsalis pedis and posterial tibial pulses are present. Pressure sensation using the [place] is present.    Left: Skin integrity is midly callused without ulcerations. Dorsalis pedis and posterial tibial pulses are present. Pressure sensation using the [place] is present but slightly reduced in the toes.      ASSESSMENT/PLAN     ASSESSMENT:  1. Type 2 diabetes mellitus with both eyes affected by proliferative retinopathy and macular edema, with long-term current use of insulin (CMS/[facility])    2. Essential hypertension    3. Medication management    4. Adhesive capsulitis of right shoulder      PLAN:  Orders Placed This Encounter   • SERVICE TO PHYSICAL THERAPY   • lisinopril (ZESTRIL) 20 MG tablet     I again stressed the importance of much stricter control of his issues. I advised him to increase the Levemir to 40 units daily and to continue to increase it by 2 units every few days until his morning blood sugar is around 120. He will continue with Humalog with each meal 1 unit/15 gm of carbohydrates plus 1 unit for every 40 points that his blood sugar is over 120. I stressed the need to check his blood sugars 4 times daily and to bring those readings with him at his next visit. I offered a referral to diabetic  education, to discuss carbohydrate counting in particular, but he has declined for now. I discussed continuous glucose monitors as well but he is not interested in one currently. The Freestyle Liset is now an option as well.    I will change lisinopril to 20 mg once daily for his hypertension.    He will need to resume a statin eventually but wishes to get his other issues under control first.    He will follow up closely with his ophthalmologist.    I will order physical therapy for his frozen shoulder.    Return in about 1 month (around 3/19/2018) for diabetes.     Unknown if ever smoked

## 2021-01-28 NOTE — ED PROVIDER NOTE - NSFOLLOWUPINSTRUCTIONS_ED_ALL_ED_FT
You will take your next dose of Misoprostol (also known as Cytotec) 12 hours from your first dose. You will experience bleeding and pelvic cramping after taking this medication. For pain you may take Motrin over the counter per label instructions and use a heating pad. If you experience severe pain, heavy bleeding or you feel faint, please seek medical attention.    You will need to see Dr. Melo on Monday for repeat blood work and an ultrasound. Please call office to schedule appointment.

## 2021-01-28 NOTE — ED PROVIDER NOTE - PATIENT PORTAL LINK FT
You can access the FollowMyHealth Patient Portal offered by [facility] by registering at the following website: [email or web address]. By joining [employer]s FollowMyHealth portal, you will also be able to view your health information using other applications (apps) compatible with our system.

## 2021-01-28 NOTE — ED ADULT NURSE NOTE - OBJECTIVE STATEMENT
received pt in bed A and OX 3  in NAD resting comfortably, pt c/.o lower abd cramping intermittent, associated with vaginal bleeding today, reports to be 6 weeks pregnant, . denies fever, chills, burning on urination, abd soft non distended non tender, orders noted and completed.

## 2021-01-28 NOTE — ED PROVIDER NOTE - PMH
Asthma  Last attack last year  Herpes     (normal spontaneous vaginal delivery)  FT 7/15/16  Fetal demise @21 wks 18

## 2021-01-28 NOTE — CONSULT NOTE ADULT - ASSESSMENT
24 y/o  6w5d by LMP  with vaginal bleeding, bHCG 170, and no gestational sac seen on TVUS. Likely with incomplete . Vital signs stable and h/h 11.8/36.9  - give oral misoprostol 600mg now  - send script for oral misoprostol 600mg to take in 12 hours  - f/u with Dr. Cruz on Monday for repeat beta and ultrasound    d/w Dr. Anthony Corona PGY1 24 y/o  6w5d by LMP  with vaginal bleeding, bHCG 170, and no gestational sac seen on TVUS. TVUS with thickened endometrial stripe (around 1.2cm) consistent with incomplete . Vital signs stable and h/h 11.8/36.9  - give oral misoprostol 600mg now  - send script for oral misoprostol 600mg to take in 12 hours  - f/u with Dr. Cruz on Monday for repeat beta and ultrasound  - return precautions given including severe abdominal pain, heavy VB saturating >2 pads/hr, fevers    d/w Dr. Anthony Corona PGY1

## 2021-01-28 NOTE — ED PROCEDURE NOTE - US CPT CODES
26810 Pregnant Uterus, Transabdominal (Confirm IUP) 74792 Pregnant Uterus, Transabdominal (Confirm IUP)/68023 Pregnant Uterus, Transvaginal (Confirm IUP)

## 2021-01-28 NOTE — ED PROVIDER NOTE - OBJECTIVE STATEMENT
25F  (1 fetal demise at 21 weeks) p/w 1 day of vaginal bleeding with clots and pelvic cramping. LNMP 20. Intermittent pelvic cramping radiating into back a/w passage of clots. Pt passed clots and one large clump of tissue in toilet today. No N/V. No dysuria. No SOB or lightheadedness.    Gyn: Sasha Cruz ([facility])

## 2021-01-29 LAB
CULTURE RESULTS: SIGNIFICANT CHANGE UP
SPECIMEN SOURCE: SIGNIFICANT CHANGE UP

## 2021-03-15 NOTE — ED PROVIDER NOTE - FAMILY HISTORY
Detail Level: Detailed Detail Level: Zone Mother  Still living? Unknown  Family history of arthritis, Age at diagnosis: Age Unknown

## 2021-05-27 ENCOUNTER — EMERGENCY (EMERGENCY)
Facility: HOSPITAL | Age: 26
LOS: 1 days | Discharge: HOME | End: 2021-05-27
Attending: EMERGENCY MEDICINE | Admitting: EMERGENCY MEDICINE
Payer: MEDICAID

## 2021-05-27 VITALS
HEIGHT: 65 IN | DIASTOLIC BLOOD PRESSURE: 56 MMHG | TEMPERATURE: 99 F | HEART RATE: 75 BPM | OXYGEN SATURATION: 98 % | SYSTOLIC BLOOD PRESSURE: 114 MMHG | RESPIRATION RATE: 16 BRPM

## 2021-05-27 DIAGNOSIS — N75.0 CYST OF BARTHOLIN'S GLAND: Chronic | ICD-10-CM

## 2021-05-27 LAB
ALBUMIN SERPL ELPH-MCNC: 3.6 G/DL — SIGNIFICANT CHANGE UP (ref 3.3–5)
ALP SERPL-CCNC: 36 U/L — LOW (ref 40–120)
ALT FLD-CCNC: 14 U/L — SIGNIFICANT CHANGE UP (ref 4–33)
ANION GAP SERPL CALC-SCNC: 10 MMOL/L — SIGNIFICANT CHANGE UP (ref 7–14)
APPEARANCE UR: CLEAR — SIGNIFICANT CHANGE UP
AST SERPL-CCNC: 13 U/L — SIGNIFICANT CHANGE UP (ref 4–32)
BACTERIA # UR AUTO: NEGATIVE — SIGNIFICANT CHANGE UP
BASOPHILS # BLD AUTO: 0.02 K/UL — SIGNIFICANT CHANGE UP (ref 0–0.2)
BASOPHILS NFR BLD AUTO: 0.4 % — SIGNIFICANT CHANGE UP (ref 0–2)
BILIRUB SERPL-MCNC: <0.2 MG/DL — SIGNIFICANT CHANGE UP (ref 0.2–1.2)
BILIRUB UR-MCNC: NEGATIVE — SIGNIFICANT CHANGE UP
BLD GP AB SCN SERPL QL: NEGATIVE — SIGNIFICANT CHANGE UP
BUN SERPL-MCNC: 10 MG/DL — SIGNIFICANT CHANGE UP (ref 7–23)
CALCIUM SERPL-MCNC: 8.9 MG/DL — SIGNIFICANT CHANGE UP (ref 8.4–10.5)
CHLORIDE SERPL-SCNC: 104 MMOL/L — SIGNIFICANT CHANGE UP (ref 98–107)
CO2 SERPL-SCNC: 22 MMOL/L — SIGNIFICANT CHANGE UP (ref 22–31)
COLOR SPEC: YELLOW — SIGNIFICANT CHANGE UP
CREAT SERPL-MCNC: 0.48 MG/DL — LOW (ref 0.5–1.3)
DIFF PNL FLD: ABNORMAL
EOSINOPHIL # BLD AUTO: 0.17 K/UL — SIGNIFICANT CHANGE UP (ref 0–0.5)
EOSINOPHIL NFR BLD AUTO: 3.1 % — SIGNIFICANT CHANGE UP (ref 0–6)
EPI CELLS # UR: 3 /HPF — SIGNIFICANT CHANGE UP (ref 0–5)
GLUCOSE SERPL-MCNC: 84 MG/DL — SIGNIFICANT CHANGE UP (ref 70–99)
GLUCOSE UR QL: NEGATIVE — SIGNIFICANT CHANGE UP
HCG SERPL-ACNC: SIGNIFICANT CHANGE UP MIU/ML
HCT VFR BLD CALC: 31.1 % — LOW (ref 34.5–45)
HGB BLD-MCNC: 10.5 G/DL — LOW (ref 11.5–15.5)
HYALINE CASTS # UR AUTO: 1 /LPF — SIGNIFICANT CHANGE UP (ref 0–7)
IANC: 3.11 K/UL — SIGNIFICANT CHANGE UP (ref 1.5–8.5)
IMM GRANULOCYTES NFR BLD AUTO: 0.2 % — SIGNIFICANT CHANGE UP (ref 0–1.5)
KETONES UR-MCNC: ABNORMAL
LEUKOCYTE ESTERASE UR-ACNC: NEGATIVE — SIGNIFICANT CHANGE UP
LYMPHOCYTES # BLD AUTO: 1.67 K/UL — SIGNIFICANT CHANGE UP (ref 1–3.3)
LYMPHOCYTES # BLD AUTO: 30.4 % — SIGNIFICANT CHANGE UP (ref 13–44)
MCHC RBC-ENTMCNC: 28.8 PG — SIGNIFICANT CHANGE UP (ref 27–34)
MCHC RBC-ENTMCNC: 33.8 GM/DL — SIGNIFICANT CHANGE UP (ref 32–36)
MCV RBC AUTO: 85.2 FL — SIGNIFICANT CHANGE UP (ref 80–100)
MONOCYTES # BLD AUTO: 0.51 K/UL — SIGNIFICANT CHANGE UP (ref 0–0.9)
MONOCYTES NFR BLD AUTO: 9.3 % — SIGNIFICANT CHANGE UP (ref 2–14)
NEUTROPHILS # BLD AUTO: 3.11 K/UL — SIGNIFICANT CHANGE UP (ref 1.8–7.4)
NEUTROPHILS NFR BLD AUTO: 56.6 % — SIGNIFICANT CHANGE UP (ref 43–77)
NITRITE UR-MCNC: NEGATIVE — SIGNIFICANT CHANGE UP
NRBC # BLD: 0 /100 WBCS — SIGNIFICANT CHANGE UP
NRBC # FLD: 0 K/UL — SIGNIFICANT CHANGE UP
PH UR: 6.5 — SIGNIFICANT CHANGE UP (ref 5–8)
PLATELET # BLD AUTO: 222 K/UL — SIGNIFICANT CHANGE UP (ref 150–400)
POTASSIUM SERPL-MCNC: 3.4 MMOL/L — LOW (ref 3.5–5.3)
POTASSIUM SERPL-SCNC: 3.4 MMOL/L — LOW (ref 3.5–5.3)
PROT SERPL-MCNC: 6.5 G/DL — SIGNIFICANT CHANGE UP (ref 6–8.3)
PROT UR-MCNC: ABNORMAL
RBC # BLD: 3.65 M/UL — LOW (ref 3.8–5.2)
RBC # FLD: 11.9 % — SIGNIFICANT CHANGE UP (ref 10.3–14.5)
RBC CASTS # UR COMP ASSIST: 1 /HPF — SIGNIFICANT CHANGE UP (ref 0–4)
RH IG SCN BLD-IMP: POSITIVE — SIGNIFICANT CHANGE UP
SODIUM SERPL-SCNC: 136 MMOL/L — SIGNIFICANT CHANGE UP (ref 135–145)
SP GR SPEC: 1.03 — HIGH (ref 1.01–1.02)
UROBILINOGEN FLD QL: ABNORMAL
WBC # BLD: 5.49 K/UL — SIGNIFICANT CHANGE UP (ref 3.8–10.5)
WBC # FLD AUTO: 5.49 K/UL — SIGNIFICANT CHANGE UP (ref 3.8–10.5)
WBC UR QL: 2 /HPF — SIGNIFICANT CHANGE UP (ref 0–5)

## 2021-05-27 PROCEDURE — 99285 EMERGENCY DEPT VISIT HI MDM: CPT

## 2021-05-27 PROCEDURE — 76801 OB US < 14 WKS SINGLE FETUS: CPT | Mod: 26

## 2021-05-27 RX ORDER — POTASSIUM CHLORIDE 20 MEQ
40 PACKET (EA) ORAL ONCE
Refills: 0 | Status: COMPLETED | OUTPATIENT
Start: 2021-05-27 | End: 2021-05-27

## 2021-05-27 RX ORDER — ACETAMINOPHEN 500 MG
650 TABLET ORAL ONCE
Refills: 0 | Status: COMPLETED | OUTPATIENT
Start: 2021-05-27 | End: 2021-05-27

## 2021-05-27 RX ADMIN — Medication 40 MILLIEQUIVALENT(S): at 18:27

## 2021-05-27 RX ADMIN — Medication 650 MILLIGRAM(S): at 15:50

## 2021-05-27 NOTE — ED PROVIDER NOTE - PATIENT PORTAL LINK FT
You can access the FollowMyHealth Patient Portal offered by [facility] by registering at the following website: [email or web address]. By joining [employer]’s FollowMyHealth portal, you will also be able to view your health information using other applications (apps) compatible with our system.

## 2021-05-27 NOTE — ED PROVIDER NOTE - ATTENDING CONTRIBUTION TO CARE
I performed a face to face evaluation of this patient and obtained a history and performed a full exam.  I agree with the history, physical exam and plan of the PA.    Brief HPI:  25F  (LMP 2/24) presenting with 1 day of abdominal cramping and brown discharge.  Denies vaginal bleeding or passage of products.      Vitals:   Reviewed    Exam:    GEN:  Non-toxic appearing, non-distressed, speaking full sentences, non-diaphoretic, AAOx3  HEENT:  NCAT, neck supple, EOMI, PERRLA, sclera anicteric, no conjunctival pallor or injection, no stridor, normal voice, no tonsillar exudate, uvula midline  CV:  regular rhythm and rate, s1/s2 audible, no murmurs, rubs or gallops, peripheral pulses 2+ and symmetric  PULM:  non-labored respirations, lungs clear to auscultation bilaterally, no wheezes, crackles or rales  ABD:  non distended, non-tender, no rebound, no guarding, negative Chapman's sign, bowel sounds normal, no cvat  MSK:  no gross deformity, non-tender extremities and joints, range of motion grossly normal appearing, no extremity edema, extremities warm and well perfused   NEURO:  AAOx3, CN II-XII intact, motor 5/5 in upper and lower extremities bilaterally, sensation grossly intact in extremities and trunk  SKIN:  warm, dry, no rash or vesicles     A/P:  25F  (LMP 2/24) presenting with 1 day of abdominal cramping and brown discharge.  VSS.  Suspect threatened ab vs. normal pregnancy.  No e/o vaginal bleeding on PA exam.  Plan for labs, ua, tvus, supportive care.  Dispo pending. I performed a face to face evaluation of this patient and obtained a history and performed a full exam.  I agree with the history, physical exam and plan of the resident.    Brief HPI:  25F  (LMP 2/24) presenting with 1 day of abdominal cramping and brown discharge.  Denies vaginal bleeding or passage of products.      Vitals:   Reviewed    Exam:    GEN:  Non-toxic appearing, non-distressed, speaking full sentences, non-diaphoretic, AAOx3  HEENT:  NCAT, neck supple, EOMI, PERRLA, sclera anicteric, no conjunctival pallor or injection, no stridor, normal voice, no tonsillar exudate, uvula midline  CV:  regular rhythm and rate, s1/s2 audible, no murmurs, rubs or gallops, peripheral pulses 2+ and symmetric  PULM:  non-labored respirations, lungs clear to auscultation bilaterally, no wheezes, crackles or rales  ABD:  non distended, non-tender, no rebound, no guarding, negative Chapman's sign, bowel sounds normal, no cvat  MSK:  no gross deformity, non-tender extremities and joints, range of motion grossly normal appearing, no extremity edema, extremities warm and well perfused   NEURO:  AAOx3, CN II-XII intact, motor 5/5 in upper and lower extremities bilaterally, sensation grossly intact in extremities and trunk  SKIN:  warm, dry, no rash or vesicles     A/P:  25F  (LMP 2/24) presenting with 1 day of abdominal cramping and brown discharge.  VSS.  Suspect threatened ab vs. normal pregnancy.  No e/o vaginal bleeding on PA exam.  Plan for labs, ua, tvus, supportive care.  Dispo pending.

## 2021-05-27 NOTE — ED PROVIDER NOTE - NS ED ROS FT
GENERAL: no fever, chills, fatigue, weight loss, night sweats  HEENT: no eye pain, discharge, conjunctivitis, ear pain, hearing loss, rhinorrhea, congestion, throat pain  CARDIAC: no chest pain, palpitations, lightheadedness, syncope  PULM: no dyspnea, wheezing  GI: + abdominal cramping  : + brown vaginal discharge  NEURO: no headache, changes in vision, motor weakness, sensory changes  MSK: no joint pain, joint swelling, myalgias  SKIN: no rashes  HEME: no active bleeding, excessive bruising

## 2021-05-27 NOTE — ED ADULT TRIAGE NOTE - CHIEF COMPLAINT QUOTE
Pt , arrives 13 weeks pregnant, c/o abdominal cramping and brown-colored discharge that started about 30 mins ago. LMP 21. Pt OB-GYN is Chapis Cruz, last seen X 2 weeks ago. Per L&D, pt to be seen in ED. No other pertinent Phx.

## 2021-05-27 NOTE — ED PROVIDER NOTE - PHYSICAL EXAMINATION
GENERAL: non-toxic appearing, in NAD  HEAD: atraumatic, normocephalic  EYES: vision grossly intact, no conjunctivitis or discharge  EARS: hearing grossly intact  NOSE: no nasal discharge, epistaxis   CARDIAC: RRR, normal S1S2,  no appreciable murmurs, no cyanosis, cap refill < 2 seconds  PULM: no respiratory distress, oxygen saturation on RA wnl, CTAB, no crackles, rales, rhonchi, or wheezing  GI: abdomen nondistended, soft, nontender, no guarding or rebound tenderness, no palpable masses  :  NEURO: awake and alert, follows commands, normal speech, PERRLA, EOMI, no focal motor or sensory deficits, normal gait  MSK: spine appears normal, no joint swelling or erythema, no gross deformities of extremities  EXT: no peripheral edema, calf tenderness, redness or swelling  SKIN: warm, dry, and intact, no rashes  PSYCH: appropriate mood and affect    Chaperone: GENERAL: non-toxic appearing, in NAD  HEAD: atraumatic, normocephalic  EYES: vision grossly intact, no conjunctivitis or discharge  EARS: hearing grossly intact  NOSE: no nasal discharge, epistaxis   CARDIAC: RRR, normal S1S2,  no appreciable murmurs, no cyanosis, cap refill < 2 seconds  PULM: no respiratory distress, oxygen saturation on RA wnl, CTAB, no crackles, rales, rhonchi, or wheezing  GI: abdomen nondistended, soft, nontender, no guarding or rebound tenderness, no palpable masses  : cervical os closed, brown foul smelling discharge from cervix, no adnexal ttp, masses  NEURO: awake and alert, follows commands, normal speech, PERRLA, EOMI, no focal motor or sensory deficits, normal gait  MSK: spine appears normal, no joint swelling or erythema, no gross deformities of extremities  EXT: no peripheral edema, calf tenderness, redness or swelling  SKIN: warm, dry, and intact, no rashes  PSYCH: appropriate mood and affect    Chaperone: Jennifer CHOI

## 2021-05-27 NOTE — ED PROVIDER NOTE - CARE PROVIDER_API CALL
Chapis Cruz  OBSTETRICS AND GYNECOLOGY  [address] [place] [address]  Phone: [phone]  Fax: [phone]  Follow Up Time: Urgent

## 2021-05-27 NOTE — ED PROVIDER NOTE - OBJECTIVE STATEMENT
25F  () presenting with 1 day of abdominal cramping and brown discharge. Denies fever, chills, dysuria, frequency, vaginal itchiness or pain. Is sexually active with 1 male partner but hasn't had intercourse in 1 month. Has a hx of chlamydia and genital herpes. Had a TVUS confirming pregnancy. OB: Chapis Cruz.

## 2021-05-27 NOTE — ED ADULT NURSE NOTE - OBJECTIVE STATEMENT
Pt received to intake presents 13 weeks pregnant, endorsing mild abd cramping associated with brown discharge that began today. Pt a&ox3, ambulatory at baseline, skin intact, respirations even and unlabored. pt has 20G Iv in left arm, labs drawn and sent, pt medicated as per ordered, will continue to monitor.

## 2021-05-27 NOTE — ED PROVIDER NOTE - CLINICAL SUMMARY MEDICAL DECISION MAKING FREE TEXT BOX
25F  (LMP ) presenting with 1 day of abdominal cramping and brown discharge. On exam, appears non-toxic clinically, VS wnl, abdominal exam benign, on  exam, ______. Will check labs, urine, TVUS, disposition pending work-up and response to treatment. 25F  (LMP ) presenting with 1 day of abdominal cramping and brown discharge. On exam, appears non-toxic clinically, VS wnl, abdominal exam benign, on  exam, cervical os closed, brown foul smelling discharge from cervix, no adnexal mass/ttp. Will check labs, urine, TVUS, disposition pending work-up and response to treatment.

## 2021-05-27 NOTE — ED PROVIDER NOTE - NS ED MD DISPO DISCHARGE CCDA
35 year old male with history of ETOH abuse, ?psych history [place] ED [place].  Per EMS report, he received 4 mg narcan with some response.  On arrival to ED patient is maintaining of airway with o2 saturation at 98% on room air and without increased work of breathing.  Additional history is limited as patient is unable to ptovide any history.  Patient with past ED visits for ETOH intox.
Patient/Caregiver provided printed discharge information.

## 2021-05-27 NOTE — ED PROVIDER NOTE - NSFOLLOWUPINSTRUCTIONS_ED_ALL_ED_FT
Your diagnosis: abdominal cramping    Your transvaginal ultrasound showed a subchorionic hematoma.    Discharge instructions:    - Please follow up with your OB/GYN urgently. Call tomorrow for an appointment.    - Tylenol up to 650 mg every 8 hours as needed for pain and/or Motrin up to 600 mg every 6 hours as needed for pain.     - Be sure to return to the ED if you develop new or worsening symptoms. Specific signs and symptoms to be vigilant of: fever or chills, pain on urination, blood in the urine, cloudy urine, foul-smelling urine, increased frequency of urination, difficulty with urination, back pain, abdominal or pelvic pain.

## 2021-05-27 NOTE — ED PROVIDER NOTE - PMH
Asthma  Last attack last year  Herpes    No pertinent past medical history     (normal spontaneous vaginal delivery)  FT 7/15/16  Fetal demise @21 wks 18

## 2021-05-28 LAB
CULTURE RESULTS: SIGNIFICANT CHANGE UP
SPECIMEN SOURCE: SIGNIFICANT CHANGE UP

## 2021-06-08 NOTE — ED ADULT NURSE NOTE - SUICIDE SCREENING DEPRESSION
[facility] encounter  HEMATOLOGY/ONCOLOGY OFFICE VISIT    CHIEF COMPLAINT:   Follow-up (Malignant neoplasm of upper-inner quadrant of right breast in female, estrogen receptor negative (CMS/HCC))      HISTORY OF PRESENT ILLNESS:  Chao Chahal is a 61year old female who presents for evaluation of breast cancer. Fatigued. Doing well. PAST HISTORIES:  Past medical history, surgical history, family history, and social history were reviewed and updated. Problem List           ICD-10-CM Noted       Oncology Problems    Malignant neoplasm of upper-inner quadrant of right breast in female, estrogen receptor negative (CMS/HCC) C50.211, Z17.1 2/3/2021    Overview Signed 2/23/2021  8:32 AM by Pippa Wellington MD     01/25/2021 diagnostic mammogram with ultrasound. Right breast.  02:00 o'clock, N +8, 6 x 7 x 4 mm nodule. 1.7 cm right axillary lymph node. 02/01/2021 ultrasound-guided biopsy right breast.  Invasive ductal carcinoma, grade 3, 0.4 cm, ERPR HER2 negative. 02/04/2021 MRI breasts. Right breast with 1.7 x 1 x 1.1 cm enhancing mass at 02:00 o'clock, N +8, right breast.  Also a non mass enhancing area measuring 7.4 x 5.4 cm of the right breast.  2/8/2021 biopsy of the lymph node was negative. 2/17/2021: MRI breast: nonmass area not seen                  Cancer Staging Summary for Mary Bronson A     Malignant neoplasm of upper-inner quadrant of right breast in female, estrogen receptor negative (CMS/HCC)     Stage Date Classification Stage Status    Not entered Clinical Stage IB (cT1c, cN0, cM0, G3, ER-, VT-, HER2-) Signed by Pippa Wellington MD on 2/10/21                MEDICATIONS / ALLERGIES:  Medications and allergies were reviewed and updated. Review of systems:  All other systems are reviewed and are negative except as documented in the history of present illness. PHYSICAL EXAM:  Vital Signs:    Oncology Encounter Vitals [06/08/21 0930]   ONC OP Encounter Vitals Group      BP 125/70      Heart Rate 90      Resp       Temp 96.5 Â°F (35.8 Â°C)      Temp src Oral      SpO2 97 %      Weight 285 lb 8 oz (129.5 kg)      Height       Pain Score  0      Pain Location       Pain Education? BSA (Calculated - m2) - Michael Rodriguez       BMI (Calculated)      ECOG Performance Status:   ECOG [06/08/21 0930]   ECOG Performance Status 0     General: The patient is alert, well-developed, well-nourished, no distress. Skin: Warm, normal color, normal texture, normal turgor and without rash. Head: Normocephalic, atraumatic. Neck: Supple with no significant adenopathy. Eyes: Normal conjunctivae and sclerae. Pupils equal, round, reactive to light and accommodation. Extraocular movements intact. ENT: Mucous membranes are moist. Normal nose,mouth and throat. Cardiovascular: Regular rate and rhythm, no murmurs, gallops or rubs. Respiratory: Normal respiratory effort. Clear to auscultation. No wheezes, rales, or rhonchi. Abdomen: Abdomen is soft and non-tender with active bowel sounds. There is no detected hepatosplenomegaly, masses, or ascites. Extremities: No clubbing, no cyanosis, no edema. Normal muscle tone and development bilaterally. Lymph: No cervical, supraclavicular, axillary, inguinal lymphadenopathy. Neurologic: Motor strength normal. Coordination normal. No tremor noted. Psychiatric: Cooperative. Appropriate mood and affect. Normal judgment.     DATA REVIEWED:  Laboratory Data:  Recent Labs   Lab 06/08/21  0915   WBC 6.2   RBC 3.81*   HGB 12.1   HCT 37.2   MCV 97.6      Absolute Neutrophils 4.6   Absolute Lymphocytes 1.2   Absolute Monocytes 0.4   Absolute Eosinophils  0.1   Absolute Basophils 0.0     Recent Labs   Lab 06/01/21  0904 02/04/21  0955 08/26/20  0921   Glucose 104*   < > 92   Sodium 142   < > 139   Potassium 4.2   < > 4.1   Chloride 106   < > 105   Carbon Dioxide 30   < > 27   BUN 13   < > 15   Creatinine 0.58  --  0.64   Calcium 8.9   < > 9.3   Magnesium  --   -- 2.3   Protein, Total 7.2   < > 7.7   Albumin 3.6   < > 3.8   GOT/AST 22   < > 13   Alkaline Phosphatase 64   < > 74   GPT 41   < > 24    < > = values in this interval not displayed. Recent Labs   Lab 06/01/21  0904   Anion Gap 10   Globulin 3.6   Bilirubin, Total 0.5       Imaging Studies:  Echo LVEF 70%, personally viewed    Mammo and US: at least stable    ASSESSMENT AND PLAN:  1. Breast CA:  Today is week 8 taxol portion of neoadjuvant ddAC + weekly taxol. We will monitor closely for toxicities. MRI and surgical f/u scheduled. 2. Decreased dex due to poor sleep. F/u 1 week. The patient indicated understanding of the diagnosis and agreed with the plan of care. The patient is encouraged to call between now and next visit with any problems, questions or concerns that arise. Negative

## 2021-11-29 ENCOUNTER — INPATIENT (INPATIENT)
Facility: HOSPITAL | Age: 26
LOS: 1 days | Discharge: HOME | End: 2021-12-01
Attending: OBSTETRICS & GYNECOLOGY | Admitting: OBSTETRICS & GYNECOLOGY

## 2021-11-29 ENCOUNTER — TRANSCRIPTION ENCOUNTER (OUTPATIENT)
Age: 26
End: 2021-11-29

## 2021-11-29 VITALS
RESPIRATION RATE: 16 BRPM | SYSTOLIC BLOOD PRESSURE: 122 MMHG | TEMPERATURE: 99 F | HEART RATE: 76 BPM | DIASTOLIC BLOOD PRESSURE: 70 MMHG

## 2021-11-29 DIAGNOSIS — Z3A.00 WEEKS OF GESTATION OF PREGNANCY NOT SPECIFIED: ICD-10-CM

## 2021-11-29 DIAGNOSIS — N75.0 CYST OF BARTHOLIN'S GLAND: Chronic | ICD-10-CM

## 2021-11-29 DIAGNOSIS — O36.8130 DECREASED FETAL MOVEMENTS, THIRD TRIMESTER, NOT APPLICABLE OR UNSPECIFIED: ICD-10-CM

## 2021-11-29 DIAGNOSIS — O26.899 OTHER SPECIFIED PREGNANCY RELATED CONDITIONS, UNSPECIFIED TRIMESTER: ICD-10-CM

## 2021-11-29 LAB
BASOPHILS # BLD AUTO: 0.01 K/UL — SIGNIFICANT CHANGE UP (ref 0–0.2)
BASOPHILS NFR BLD AUTO: 0.2 % — SIGNIFICANT CHANGE UP (ref 0–2)
BLD GP AB SCN SERPL QL: NEGATIVE — SIGNIFICANT CHANGE UP
COVID-19 SPIKE DOMAIN AB INTERP: NEGATIVE — SIGNIFICANT CHANGE UP
COVID-19 SPIKE DOMAIN ANTIBODY RESULT: 0.4 U/ML — SIGNIFICANT CHANGE UP
EOSINOPHIL # BLD AUTO: 0.12 K/UL — SIGNIFICANT CHANGE UP (ref 0–0.5)
EOSINOPHIL NFR BLD AUTO: 2.4 % — SIGNIFICANT CHANGE UP (ref 0–6)
HCT VFR BLD CALC: 33.5 % — LOW (ref 34.5–45)
HGB BLD-MCNC: 10.8 G/DL — LOW (ref 11.5–15.5)
IANC: 2.89 K/UL — SIGNIFICANT CHANGE UP (ref 1.5–8.5)
IMM GRANULOCYTES NFR BLD AUTO: 0.2 % — SIGNIFICANT CHANGE UP (ref 0–1.5)
LYMPHOCYTES # BLD AUTO: 1.48 K/UL — SIGNIFICANT CHANGE UP (ref 1–3.3)
LYMPHOCYTES # BLD AUTO: 29.2 % — SIGNIFICANT CHANGE UP (ref 13–44)
MCHC RBC-ENTMCNC: 26.5 PG — LOW (ref 27–34)
MCHC RBC-ENTMCNC: 32.2 GM/DL — SIGNIFICANT CHANGE UP (ref 32–36)
MCV RBC AUTO: 82.3 FL — SIGNIFICANT CHANGE UP (ref 80–100)
MONOCYTES # BLD AUTO: 0.56 K/UL — SIGNIFICANT CHANGE UP (ref 0–0.9)
MONOCYTES NFR BLD AUTO: 11 % — SIGNIFICANT CHANGE UP (ref 2–14)
NEUTROPHILS # BLD AUTO: 2.89 K/UL — SIGNIFICANT CHANGE UP (ref 1.8–7.4)
NEUTROPHILS NFR BLD AUTO: 57 % — SIGNIFICANT CHANGE UP (ref 43–77)
NRBC # BLD: 0 /100 WBCS — SIGNIFICANT CHANGE UP
NRBC # FLD: 0 K/UL — SIGNIFICANT CHANGE UP
PLATELET # BLD AUTO: 197 K/UL — SIGNIFICANT CHANGE UP (ref 150–400)
RBC # BLD: 4.07 M/UL — SIGNIFICANT CHANGE UP (ref 3.8–5.2)
RBC # FLD: 13.7 % — SIGNIFICANT CHANGE UP (ref 10.3–14.5)
RH IG SCN BLD-IMP: POSITIVE — SIGNIFICANT CHANGE UP
SARS-COV-2 IGG+IGM SERPL QL IA: 0.4 U/ML — SIGNIFICANT CHANGE UP
SARS-COV-2 IGG+IGM SERPL QL IA: NEGATIVE — SIGNIFICANT CHANGE UP
SARS-COV-2 RNA SPEC QL NAA+PROBE: SIGNIFICANT CHANGE UP
WBC # BLD: 5.07 K/UL — SIGNIFICANT CHANGE UP (ref 3.8–10.5)
WBC # FLD AUTO: 5.07 K/UL — SIGNIFICANT CHANGE UP (ref 3.8–10.5)

## 2021-11-29 RX ORDER — OXYCODONE HYDROCHLORIDE 5 MG/1
5 TABLET ORAL
Refills: 0 | Status: DISCONTINUED | OUTPATIENT
Start: 2021-11-29 | End: 2021-12-01

## 2021-11-29 RX ORDER — TETANUS TOXOID, REDUCED DIPHTHERIA TOXOID AND ACELLULAR PERTUSSIS VACCINE, ADSORBED 5; 2.5; 8; 8; 2.5 [IU]/.5ML; [IU]/.5ML; UG/.5ML; UG/.5ML; UG/.5ML
0.5 SUSPENSION INTRAMUSCULAR ONCE
Refills: 0 | Status: COMPLETED | OUTPATIENT
Start: 2021-11-29

## 2021-11-29 RX ORDER — OXYTOCIN 10 UNIT/ML
2 VIAL (ML) INJECTION
Qty: 30 | Refills: 0 | Status: DISCONTINUED | OUTPATIENT
Start: 2021-11-29 | End: 2021-11-29

## 2021-11-29 RX ORDER — IBUPROFEN 200 MG
1 TABLET ORAL
Qty: 0 | Refills: 0 | DISCHARGE

## 2021-11-29 RX ORDER — OXYCODONE HYDROCHLORIDE 5 MG/1
5 TABLET ORAL ONCE
Refills: 0 | Status: DISCONTINUED | OUTPATIENT
Start: 2021-11-29 | End: 2021-12-01

## 2021-11-29 RX ORDER — INFLUENZA VIRUS VACCINE 15; 15; 15; 15 UG/.5ML; UG/.5ML; UG/.5ML; UG/.5ML
0.5 SUSPENSION INTRAMUSCULAR ONCE
Refills: 0 | Status: DISCONTINUED | OUTPATIENT
Start: 2021-11-29 | End: 2021-12-01

## 2021-11-29 RX ORDER — AER TRAVELER 0.5 G/1
1 SOLUTION RECTAL; TOPICAL EVERY 4 HOURS
Refills: 0 | Status: DISCONTINUED | OUTPATIENT
Start: 2021-11-29 | End: 2021-12-01

## 2021-11-29 RX ORDER — IBUPROFEN 200 MG
600 TABLET ORAL EVERY 6 HOURS
Refills: 0 | Status: COMPLETED | OUTPATIENT
Start: 2021-11-29 | End: 2022-10-28

## 2021-11-29 RX ORDER — ACETAMINOPHEN 500 MG
1 TABLET ORAL
Qty: 0 | Refills: 0 | DISCHARGE

## 2021-11-29 RX ORDER — KETOROLAC TROMETHAMINE 30 MG/ML
30 SYRINGE (ML) INJECTION ONCE
Refills: 0 | Status: DISCONTINUED | OUTPATIENT
Start: 2021-11-29 | End: 2021-11-30

## 2021-11-29 RX ORDER — SODIUM CHLORIDE 9 MG/ML
3 INJECTION INTRAMUSCULAR; INTRAVENOUS; SUBCUTANEOUS EVERY 8 HOURS
Refills: 0 | Status: DISCONTINUED | OUTPATIENT
Start: 2021-11-29 | End: 2021-12-01

## 2021-11-29 RX ORDER — HYDROCORTISONE 1 %
1 OINTMENT (GRAM) TOPICAL EVERY 6 HOURS
Refills: 0 | Status: DISCONTINUED | OUTPATIENT
Start: 2021-11-29 | End: 2021-12-01

## 2021-11-29 RX ORDER — DIBUCAINE 1 %
1 OINTMENT (GRAM) RECTAL EVERY 6 HOURS
Refills: 0 | Status: DISCONTINUED | OUTPATIENT
Start: 2021-11-29 | End: 2021-12-01

## 2021-11-29 RX ORDER — MAGNESIUM HYDROXIDE 400 MG/1
30 TABLET, CHEWABLE ORAL
Refills: 0 | Status: DISCONTINUED | OUTPATIENT
Start: 2021-11-29 | End: 2021-12-01

## 2021-11-29 RX ORDER — DIPHENHYDRAMINE HCL 50 MG
25 CAPSULE ORAL EVERY 6 HOURS
Refills: 0 | Status: DISCONTINUED | OUTPATIENT
Start: 2021-11-29 | End: 2021-12-01

## 2021-11-29 RX ORDER — LANOLIN
1 OINTMENT (GRAM) TOPICAL EVERY 6 HOURS
Refills: 0 | Status: DISCONTINUED | OUTPATIENT
Start: 2021-11-29 | End: 2021-12-01

## 2021-11-29 RX ORDER — BENZOCAINE 10 %
1 GEL (GRAM) MUCOUS MEMBRANE EVERY 6 HOURS
Refills: 0 | Status: DISCONTINUED | OUTPATIENT
Start: 2021-11-29 | End: 2021-12-01

## 2021-11-29 RX ORDER — OXYTOCIN 10 UNIT/ML
333.33 VIAL (ML) INJECTION
Qty: 20 | Refills: 0 | Status: COMPLETED | OUTPATIENT
Start: 2021-11-29 | End: 2021-11-29

## 2021-11-29 RX ORDER — VALACYCLOVIR 500 MG/1
0 TABLET, FILM COATED ORAL
Qty: 0 | Refills: 0 | DISCHARGE

## 2021-11-29 RX ORDER — ACETAMINOPHEN 500 MG
975 TABLET ORAL
Refills: 0 | Status: DISCONTINUED | OUTPATIENT
Start: 2021-11-29 | End: 2021-12-01

## 2021-11-29 RX ORDER — SODIUM CHLORIDE 9 MG/ML
1000 INJECTION, SOLUTION INTRAVENOUS
Refills: 0 | Status: DISCONTINUED | OUTPATIENT
Start: 2021-11-29 | End: 2021-11-29

## 2021-11-29 RX ORDER — PRAMOXINE HYDROCHLORIDE 150 MG/15G
1 AEROSOL, FOAM RECTAL EVERY 4 HOURS
Refills: 0 | Status: DISCONTINUED | OUTPATIENT
Start: 2021-11-29 | End: 2021-12-01

## 2021-11-29 RX ORDER — SIMETHICONE 80 MG/1
80 TABLET, CHEWABLE ORAL EVERY 4 HOURS
Refills: 0 | Status: DISCONTINUED | OUTPATIENT
Start: 2021-11-29 | End: 2021-12-01

## 2021-11-29 RX ORDER — OXYTOCIN 10 UNIT/ML
333.33 VIAL (ML) INJECTION
Qty: 20 | Refills: 0 | Status: DISCONTINUED | OUTPATIENT
Start: 2021-11-29 | End: 2021-12-01

## 2021-11-29 RX ADMIN — Medication 1000 MILLIUNIT(S)/MIN: at 23:16

## 2021-11-29 RX ADMIN — SODIUM CHLORIDE 125 MILLILITER(S): 9 INJECTION, SOLUTION INTRAVENOUS at 16:01

## 2021-11-29 RX ADMIN — Medication 2 MILLIUNIT(S)/MIN: at 16:01

## 2021-11-29 RX ADMIN — Medication 1000 MILLIUNIT(S)/MIN: at 22:46

## 2021-11-29 NOTE — DISCHARGE NOTE OB - MEDICATION SUMMARY - MEDICATIONS TO STOP TAKING
I will STOP taking the medications listed below when I get home from the hospital:    Valtrex 500 mg oral tablet  -- orally 2 times a day 9am

## 2021-11-29 NOTE — DISCHARGE NOTE OB - PATIENT PORTAL LINK FT
You can access the FollowMyHealth Patient Portal offered by [facility] by registering at the following website: [email or web address]. By joining [employer]’s FollowMyHealth portal, you will also be able to view your health information using other applications (apps) compatible with our system.

## 2021-11-29 NOTE — OB RN PATIENT PROFILE - NS_PRENATALLABSOURCEGBS36_OBGYN_ALL_OB
----- Message from Sommer Pedersen DO sent at 11/20/2019  7:18 AM CST -----  Thyroid:  TSH mildly elevated and free T4 low normal.    Glucose 120. Recommend glycohemoglobin.    Dyslipidemia:  LDL near optimal at 119. HDL near optimal at 51. Triglycerides elevated at 239. Stressed diet and exercise.  Continue Zetia.   hard copy, drawn during this pregnancy

## 2021-11-29 NOTE — DISCHARGE NOTE OB - CARE PROVIDER_API CALL
Chapis Cruz)  Obstetrics and Gynecology  [address] [place] [address]  Phone: [phone]  Fax: [phone]  Follow Up Time:

## 2021-11-29 NOTE — DISCHARGE NOTE OB - CARE PLAN
1 Principal Discharge DX:	 (normal spontaneous vaginal delivery)  Assessment and plan of treatment:	return 6 weeks

## 2021-11-29 NOTE — DISCHARGE NOTE OB - NS MD DC FALL RISK RISK
For information on Fall & Injury Prevention, visit: [email or web address] OR  [email or web address] OR  https://www.cdc.gov/steadi/patient.html

## 2021-11-29 NOTE — OB RN DELIVERY SUMMARY - NSSELHIDDEN_OBGYN_ALL_OB_FT
[NS_DeliveryAttending1_OBGYN_ALL_OB_FT:[id number]],[NS_DeliveryRN_OBGYN_ALL_OB_FT:[id number]=]

## 2021-11-29 NOTE — OB RN TRIAGE NOTE - NSICDXPASTMEDICALHX_GEN_ALL_CORE_FT
PAST MEDICAL HISTORY:  Asthma Last attack last year    Herpes      (normal spontaneous vaginal delivery) FT 7/15/16  Fetal demise @21 wks 18

## 2021-11-29 NOTE — OB PROVIDER TRIAGE NOTE - HISTORY OF PRESENT ILLNESS
25yo Black female  @ 40.1 wks SLIUP uncomp PNC here from Dr Cruz' office complaining of decreased FM x few days. Pt is intact and denies VB. Pt has a VE in the office where her membranes were stripped.      Pmhx-denies  Pshx/Hosp-Bartgolin's cyst  Meds-PNV; Valtrex 500mg BID  NKDA  Past ob-7/15/2016-7#9  FT               7/15/2016-8#  FT                - wk IUFD; 2021-SAB  Gyn-hx HSV with last outbreak 2 weeks ago; Bartholin cyst  Soc-denies

## 2021-11-29 NOTE — DISCHARGE NOTE OB - MATERIALS PROVIDED
Vaccinations/[facility]  Screening Program/  Immunization Record/Breastfeeding Log/Bottle Feeding Log/Breastfeeding Mother’s Support Group Information/Guide to Postpartum Care/[facility] Hearing Screen Program/Back To Sleep Handout/Shaken Baby Prevention Handout/Breastfeeding Guide and Packet/Birth Certificate Instructions/Tdap Vaccination (VIS Pub Date: 2012)

## 2021-11-29 NOTE — OB PROVIDER H&P - HISTORY OF PRESENT ILLNESS
25yo Black female  @ 40.1 wks SLIUP uncomp PNC here from Dr Cruz' office complaining of decreased FM x few days. Pt is intact and denies VB. Pt has a VE in the office where her membranes were stripped.      Pmhx-denies  Pshx/Hosp-Bartgolin's cyst  Meds-PNV; Valtrex 500mg BID  NKDA  Past ob-7/15/2016-7#9  FT               7/15/2016-8#  FT                - wk IUFD; 2021-SAB  Gyn-hx HSV with last outbreak 2 weeks ago; Bartholin cyst  Soc-denies 27yo Black female  @ 40.1 wks SLIUP uncomp PNC here from Dr Cruz' office complaining of decreased FM x few days. Pt is intact and denies VB. Pt has a VE in the office where her membranes were stripped.      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

## 2021-11-29 NOTE — OB RN PATIENT PROFILE - NS_OBGYNHISTORY_OBGYN_ALL_OB_FT
2016 7-9     8lbs  sab x 1  2018 fetal demise @ 21 wk  2016 79     8lbs- trupti-pick type c disease   sab x 1  2018 fetal demise @ 21 wk

## 2021-11-29 NOTE — OB PROVIDER H&P - ASSESSMENT
25yo Black female  @ 40.1 wks SLIUP uncomp PNC here from Dr Philippe' office for IOL for pt complaint of decreased FM x several days  -Hx HSV, last outbreak 2 weeks ago; SSE is negative  -VE-3.5/70/-3; vtx  -pt was dw Dr Lopez  -pt for pitocin IOL  -pt was swabbed for Covid-19

## 2021-11-29 NOTE — DISCHARGE NOTE OB - MEDICATION SUMMARY - MEDICATIONS TO TAKE
I will START or STAY ON the medications listed below when I get home from the hospital:    ibuprofen 600 mg oral tablet  -- 1 tab(s) by mouth every 6 hours  -- Indication: For pain    Tylenol 325 mg oral capsule  -- 1 cap(s) by mouth 3 times a day  -- Indication: For pain    Prena1 oral capsule  -- 1 cap(s) by mouth once a day  -- Indication: For vitamins

## 2021-11-29 NOTE — OB PROVIDER TRIAGE NOTE - NSHPPHYSICALEXAM_GEN_ALL_CORE
Gen: A&O x 3; NAD  Vitals; BP-122/70; P-76; T-37.0    Pulm-CTA B/L; no wheezes  Cor-clear S1S2; no murmurs  Abd exam-soft and nontender    SSE-no HSV lesion in vaginal vault or around perineum  VE-3.5/70/-3    TAS to confirm vtx    NST cat I with 135 baseline with accels and mod variability; infreq ctx's

## 2021-11-29 NOTE — OB RN DELIVERY SUMMARY - NS_SEPSISRSKCALC_OBGYN_ALL_OB_FT
EOS calculated successfully. EOS Risk Factor: 0.5/1000 live births ([facility] national incidence); GA=40w1d; Temp=98.6; ROM=0.783; GBS='Negative'; Antibiotics='No antibiotics or any antibiotics < 2 hrs prior to birth'

## 2021-11-29 NOTE — OB RN PATIENT PROFILE - HEIGHT IN FEET
CERTIFICATE OF RETURN TO PHYSICAL EDUCATION and SPORTS    January 17, 2018      Re:   [place] JIMMIE Gudino  [id number]  [place] [address]                        This is to certify that Rafiq Gudino has been under my care from 1/17/2018 and is unable to return to physical education or sports until further notice.              SIGNATURE:___________________________________________,   1/17/2018      Iggy Peres MD           Orthopaedics  [address] [place] [id number] [phone]        
5

## 2021-11-30 LAB — T PALLIDUM AB TITR SER: NEGATIVE — SIGNIFICANT CHANGE UP

## 2021-11-30 RX ORDER — IBUPROFEN 200 MG
600 TABLET ORAL EVERY 6 HOURS
Refills: 0 | Status: DISCONTINUED | OUTPATIENT
Start: 2021-11-30 | End: 2021-12-01

## 2021-11-30 RX ADMIN — Medication 600 MILLIGRAM(S): at 09:45

## 2021-11-30 RX ADMIN — Medication 600 MILLIGRAM(S): at 09:00

## 2021-11-30 RX ADMIN — Medication 975 MILLIGRAM(S): at 22:21

## 2021-11-30 RX ADMIN — Medication 975 MILLIGRAM(S): at 12:43

## 2021-11-30 RX ADMIN — Medication 600 MILLIGRAM(S): at 15:04

## 2021-11-30 RX ADMIN — Medication 30 MILLIGRAM(S): at 00:51

## 2021-11-30 RX ADMIN — Medication 975 MILLIGRAM(S): at 15:04

## 2021-11-30 RX ADMIN — Medication 30 MILLIGRAM(S): at 01:26

## 2021-11-30 RX ADMIN — SODIUM CHLORIDE 3 MILLILITER(S): 9 INJECTION INTRAMUSCULAR; INTRAVENOUS; SUBCUTANEOUS at 06:17

## 2021-11-30 RX ADMIN — SODIUM CHLORIDE 3 MILLILITER(S): 9 INJECTION INTRAMUSCULAR; INTRAVENOUS; SUBCUTANEOUS at 22:21

## 2021-11-30 RX ADMIN — Medication 975 MILLIGRAM(S): at 22:48

## 2021-11-30 RX ADMIN — Medication 600 MILLIGRAM(S): at 16:00

## 2021-11-30 NOTE — OB PROVIDER DELIVERY SUMMARY - NSPROVIDERDELIVERYNOTE_OBGYN_ALL_OB_FT
Spontaneous vaginal delivery of liveborn infant from JUDIE position. Head, shoulders, and body delivered easily. Infant was suctioned. Terminal meconium noted. Cord was clamped and cut and infant was passed to mother and then peds for evaluation. Apgars 9_9. Placenta delivered intact with a 3 vessel cord. Fundal massage was given and uterine fundus was found to be firm. Vaginal exam revealed an intact cervix, vaginal walls and sulci. Patient had no lacerations noted on exam. Excellent hemostasis was noted. Patient was stable. Count was correct x 2. .

## 2021-11-30 NOTE — PROGRESS NOTE ADULT - SUBJECTIVE AND OBJECTIVE BOX
Post partum Day 1      Pt without complaints    Vital Signs Last 24 Hrs  T(C): 37.1 (30 Nov 2021 06:20), Max: 37.1 (30 Nov 2021 06:20)  T(F): 98.8 (30 Nov 2021 06:20), Max: 98.8 (30 Nov 2021 06:20)  HR: 97 (30 Nov 2021 06:20) (62 - 110)  BP: 110/57 (30 Nov 2021 06:20) (87/51 - 136/60)  BP(mean): --  RR: 18 (30 Nov 2021 06:20) (16 - 18)  SpO2: 100% (30 Nov 2021 06:20) (85% - 100%)                        10.8   5.07  )-----------( 197      ( 29 Nov 2021 14:46 )             33.5     MEDICATIONS  (STANDING):  acetaminophen     Tablet .. 975 milliGRAM(s) Oral <User Schedule>  diphtheria/tetanus/pertussis (acellular) Vaccine (ADAcel) 0.5 milliLiter(s) IntraMuscular once  ibuprofen  Tablet. 600 milliGRAM(s) Oral every 6 hours  influenza   Vaccine 0.5 milliLiter(s) IntraMuscular once  oxytocin Infusion 333.333 milliUNIT(s)/Min (1000 mL/Hr) IV Continuous <Continuous>  prenatal multivitamin 1 Tablet(s) Oral daily  sodium chloride 0.9% lock flush 3 milliLiter(s) IV Push every 8 hours    MEDICATIONS  (PRN):  benzocaine 20%/menthol 0.5% Spray 1 Spray(s) Topical every 6 hours PRN for Perineal discomfort  dibucaine 1% Ointment 1 Application(s) Topical every 6 hours PRN Perineal discomfort  diphenhydrAMINE 25 milliGRAM(s) Oral every 6 hours PRN Pruritus  hydrocortisone 1% Cream 1 Application(s) Topical every 6 hours PRN Moderate Pain (4-6)  lanolin Ointment 1 Application(s) Topical every 6 hours PRN nipple soreness  magnesium hydroxide Suspension 30 milliLiter(s) Oral two times a day PRN Constipation  oxyCODONE    IR 5 milliGRAM(s) Oral every 3 hours PRN Moderate to Severe Pain (4-10)  oxyCODONE    IR 5 milliGRAM(s) Oral once PRN Moderate to Severe Pain (4-10)  pramoxine 1%/zinc 5% Cream 1 Application(s) Topical every 4 hours PRN Moderate Pain (4-6)  simethicone 80 milliGRAM(s) Chew every 4 hours PRN Gas  witch hazel Pads 1 Application(s) Topical every 4 hours PRN Perineal discomfort      Abdomen soft  fundus firm, non tender  extremities non tender    lochia wnl      Patient doing well  Routine post partum care

## 2021-11-30 NOTE — OB PROVIDER DELIVERY SUMMARY - NSSELHIDDEN_OBGYN_ALL_OB_FT
[NS_DeliveryAttending1_OBGYN_ALL_OB_FT:[id number]],[NS_DeliveryRN_OBGYN_ALL_OB_FT:[id number]],[NS_DeliveryAssist1_OBGYN_ALL_OB_FT:[id number]]

## 2021-11-30 NOTE — OB PROVIDER DELIVERY SUMMARY - NSANTENATALSTERA_OBGYN_ALL_OB
[de-identified] : Ms. Wooten is a 69 y/o patient who is being seen for suspicion of Hogdkin's Lymphoma.  \par \par Her medical history is significant for a remote history of stage I ER+ right breast cancer in 1998, s/p lumpectomy, followed by [facility] x 6 months and adjuvant RT. She took raloxifene for 5 years. In 2108, she underwent bilateral mastectomy for bilateral DCIS with close margins, subsequent re-excision showed no residual disease. \par She has recurrent UTIs, spinal stenosis, RA.\par \par She is s/p fall in kitchen, 06/26/21. Admitted to hospital, fractured sacrum and fractured L2 (states that L2 fracture might have previously been there).\par 6/30/21 MRI sacrum - Acute H-shaped sacral fracture. Advanced bilateral hip osteoarthritis. Small to moderate-sized bilateral hip joint effusions with synovitis.\par 6/30/21 MRI thoracic spine -Multilevel small thoracic spine disc herniations with cord impingement. No cord signal abnormality.\par Chronic L2 superior anterior corner fracture.\par Probable nondisplaced sacrococcygeal fracture with presacral edema. Dedicated bony pelvis MRI CT is recommended.\par Right paravertebral soft tissue lesion at the level of T12/L1 suggests nerve sheath tumor. Nonemergent postcontrast images are recommended.\par \par She was referred to Dr. Interiano and subsequently had a core needle biopsy of the right paravertebral soft tissue mass at T12/L1 on 9/14/21. \par Pathology - suspicious for Hodgkins' lymphoma, favor nodular lymphocyte predominant. However additional tissue is recommended for further evaluation. \par \par \par PET/CT Skull-Thigh 09/30/21: IMPRESSION: FDG-PET/CT scan:\par 1. FDG avid oval soft tissue along the right thoracic paraspinal region, corresponding to the enhancing oval mass on MRI 8/2/2021.\par 2. Linear FDG avidity in the right sacral ala, corresponding to insufficiency fracture, also noted on MRI.\par 3. Minimal stranding adjacent to the anterior surface of the left breast implant, nonspecific. Please correlate clinically.\par \par T Cell Gene Rearrangement 09/22/21: TCR-beta: Negative, TCR-gamma: Negative\par Interpretation: No discrete bands were identified in either TCR-beta or TCR-gamma, suggesting a polycional T-cell population\par \par Pathology 09/14/21: Final Diagnosis\par 1.  Paraspinal mass, right, core biopsies:\par - Suspicious for Hodgkin Lymphoma.\par  \par MRI Lumbar Spine 08/02/21: IMPRESSION:\par 1. Enhancing oval soft tissue mass is again seen along the inferior margin of the right 12th rib at the costovertebral junction, abutting the pleura, and without T12-L1 foraminal extension. This appears stable and a peripheral nerve sheath tumor may be considered as well as other processes including metastatic disease. Correlation with older imaging is suggested if available to assess stability. Consider PET CT or CT chest abdomen and pelvis for further evaluation or follow-up MRI to assess stability.\par 2. Presumed progression of the sacral insufficiency fractures with diffuse marrow edema and marrow replacement of the right sacral ala. Superimposed metastatic disease could not be absolutely excluded. CT may be helpful for further characterization or follow-up MRI to assess stability and to assess healing.\par \par Currently, taking Percocet at night for pain in the sacrum. Since 2015, has been on Gabapentin due to shingles. Also taking Methenamine for UTI. \par No fever, night sweats.Admits intermittent hot flashes. Difficulty sleeping due to pain in sacrum.\par \par PCP - Romaine Guzman \par \par 
Not applicable as gestational age is greater than or equal to 34 weeks.

## 2021-12-01 VITALS
RESPIRATION RATE: 17 BRPM | SYSTOLIC BLOOD PRESSURE: 128 MMHG | DIASTOLIC BLOOD PRESSURE: 78 MMHG | OXYGEN SATURATION: 100 % | HEART RATE: 87 BPM | TEMPERATURE: 99 F

## 2021-12-01 RX ORDER — TETANUS TOXOID, REDUCED DIPHTHERIA TOXOID AND ACELLULAR PERTUSSIS VACCINE, ADSORBED 5; 2.5; 8; 8; 2.5 [IU]/.5ML; [IU]/.5ML; UG/.5ML; UG/.5ML; UG/.5ML
0.5 SUSPENSION INTRAMUSCULAR ONCE
Refills: 0 | Status: COMPLETED | OUTPATIENT
Start: 2021-12-01 | End: 2021-12-01

## 2021-12-01 RX ADMIN — TETANUS TOXOID, REDUCED DIPHTHERIA TOXOID AND ACELLULAR PERTUSSIS VACCINE, ADSORBED 0.5 MILLILITER(S): 5; 2.5; 8; 8; 2.5 SUSPENSION INTRAMUSCULAR at 07:00

## 2021-12-01 RX ADMIN — Medication 975 MILLIGRAM(S): at 06:24

## 2021-12-01 RX ADMIN — Medication 975 MILLIGRAM(S): at 05:54

## 2021-12-01 NOTE — PROGRESS NOTE ADULT - SUBJECTIVE AND OBJECTIVE BOX
Post partum Day #2      Pt without complaints  vital signs stable      Abdomen soft  fundus firm, non tender  extremities non tender    lochia wnl      Patient doing well  Routine post partum care  Patient requesting dischare today

## 2022-04-16 NOTE — ED PROVIDER NOTE - PHYSICAL EXAMINATION
CONSTITUTIONAL: Well appearing, well nourished, awake, alert, oriented to person, place, time/situation and in no apparent distress  ENMT: Airway patent  EYES: Clear bilaterally  CARDIAC: Normal rate, regular rhythm.  RESPIRATORY: Breath sounds clear and equal bilaterally.  ABDOMEN: Abdomen soft, non-tender, no guarding  MUSCULOSKELETAL: Spine appears normal, no deformities, equal active FROM bilaterally  NEUROLOGIC: CN II-XII grossly intact, moves all extremities without lateralization  SKIN: Exposed skin normal color for race, warm, dry and intact  GYN: Oriented - self; Oriented - place; Oriented - time

## 2022-05-13 NOTE — ED ADULT TRIAGE NOTE - BP NONINVASIVE DIASTOLIC (MM HG)
Patient:   CHANDANA GARCÍA, GIRL            MRN: [id number]            FIN: [id number]              Age:   0 hours     Sex:  FEMALE     :  20   Associated Diagnoses:   None   Author:   TRUDY PULLIAM     Delivery Room Physician Intervention Note    I was asked to attend this delivery due to   -  delivery  - Gestational HTN  - Pyelectesis  Abnormal maternal labs  - None  Maternal medications  - Betamethasone  Presentation  - Vertex  - CAN x 1  Delivery  - C Section  Infant's condition at delivery:   - spontaneous respirations  - active  Resuscitation required:   - placed under radiant warmer  - dried/suctioned  - stable in room air  Apgars assigned: 9at 1 minute, 9 at 5 minutes  Brief initial physical exam:   - Gestational age: 37 weeks  - Birth weight: 2565 g  - No abnormalities noted  - No distress noted.  Assessment  - well appearing and crying  Plan:  - Well baby care  - Initiate Breastfeeding  - monitor blood sugar x 2  - renal US before discharge  Disposition:   - Left in DR with mother   63 Itraconazole Pregnancy And Lactation Text: This medication is Pregnancy Category C and it isn't know if it is safe during pregnancy. It is also excreted in breast milk.

## 2022-10-20 ENCOUNTER — EMERGENCY (EMERGENCY)
Facility: HOSPITAL | Age: 27
LOS: 0 days | Discharge: LEFT BEFORE DISCHARGE | End: 2022-10-20

## 2022-10-20 VITALS
HEART RATE: 106 BPM | WEIGHT: 179.9 LBS | SYSTOLIC BLOOD PRESSURE: 113 MMHG | HEIGHT: 65 IN | DIASTOLIC BLOOD PRESSURE: 76 MMHG | RESPIRATION RATE: 18 BRPM | TEMPERATURE: 100 F | OXYGEN SATURATION: 99 %

## 2022-10-20 DIAGNOSIS — Z53.21 PROCEDURE AND TREATMENT NOT CARRIED OUT DUE TO PATIENT LEAVING PRIOR TO BEING SEEN BY HEALTH CARE PROVIDER: ICD-10-CM

## 2022-10-20 DIAGNOSIS — R05.9 COUGH, UNSPECIFIED: ICD-10-CM

## 2022-10-20 DIAGNOSIS — N75.0 CYST OF BARTHOLIN'S GLAND: Chronic | ICD-10-CM

## 2022-10-20 PROCEDURE — 93010 ELECTROCARDIOGRAM REPORT: CPT

## 2022-10-20 PROCEDURE — L9991: CPT

## 2023-01-30 ENCOUNTER — EMERGENCY (EMERGENCY)
Facility: HOSPITAL | Age: 28
LOS: 1 days | Discharge: HOME | End: 2023-01-30
Attending: STUDENT IN AN ORGANIZED HEALTH CARE EDUCATION/TRAINING PROGRAM | Admitting: STUDENT IN AN ORGANIZED HEALTH CARE EDUCATION/TRAINING PROGRAM
Payer: MEDICAID

## 2023-01-30 VITALS
OXYGEN SATURATION: 99 % | SYSTOLIC BLOOD PRESSURE: 134 MMHG | RESPIRATION RATE: 18 BRPM | TEMPERATURE: 99 F | HEART RATE: 88 BPM | DIASTOLIC BLOOD PRESSURE: 85 MMHG

## 2023-01-30 DIAGNOSIS — N75.0 CYST OF BARTHOLIN'S GLAND: Chronic | ICD-10-CM

## 2023-01-30 PROCEDURE — 99284 EMERGENCY DEPT VISIT MOD MDM: CPT

## 2023-01-30 RX ORDER — ACETAMINOPHEN 500 MG
975 TABLET ORAL ONCE
Refills: 0 | Status: COMPLETED | OUTPATIENT
Start: 2023-01-30 | End: 2023-01-30

## 2023-01-30 RX ORDER — IBUPROFEN 200 MG
600 TABLET ORAL ONCE
Refills: 0 | Status: COMPLETED | OUTPATIENT
Start: 2023-01-30 | End: 2023-01-30

## 2023-01-30 RX ORDER — LIDOCAINE 4 G/100G
1 CREAM TOPICAL ONCE
Refills: 0 | Status: COMPLETED | OUTPATIENT
Start: 2023-01-30 | End: 2023-01-30

## 2023-01-30 NOTE — ED PROVIDER NOTE - OBJECTIVE STATEMENT
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 Today, pt's pain increased in her right-mid back and worsened, causing ED visit. Denies fevers, chills, nausea, vomiting, dizziness, chest pain, SOB, abdominal pain, dysuria, hematuria. Denies new onset numbness/tingling, urinary retention/incontinence, and saddle anesthesia.

## 2023-01-30 NOTE — ED PROVIDER NOTE - NSTIMEPROVIDERCAREINITIATE_GEN_ER
Dysuria post surgery. Urine collected.
Patient is aware and verbalized understanding
UA + for large leuk and \"thick\". Will treat. macrobid sent to her [facility] -   Please notify pt.
30-Jan-2023 23:20

## 2023-01-30 NOTE — ED ADULT TRIAGE NOTE - CHIEF COMPLAINT QUOTE
Pt complaining of spine pain, upper back pain worsening today,. Pt state she had a fall last april. Pt deneis recent fall.

## 2023-01-30 NOTE — ED PROVIDER NOTE - PATIENT PORTAL LINK FT
You can access the FollowMyHealth Patient Portal offered by [facility] by registering at the following website: [email or web address]. By joining [facility] FollowMyHealth portal, you will also be able to view your health information using other applications (apps) compatible with our system.

## 2023-01-30 NOTE — ED PROVIDER NOTE - CLINICAL SUMMARY MEDICAL DECISION MAKING FREE TEXT BOX
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 Today, pt's pain increased in her right-mid back and worsened, causing ED visit. Patient's pain is likely due to MSK-related muscle strain. There is lower concern for cauda equina syndrome  (able to ambulate, no saddle anesthesia, urinary retention/incontinence), spinal epidural abscess (no fevers, chills, signs of inflammation). No rashes (low concern for shingles). Well treat patient's pain, provide spine center referral, and reassess patient w/ likely d/c w/ close PCP f/u.

## 2023-01-30 NOTE — ED PROVIDER NOTE - PHYSICAL EXAMINATION
GENERAL: NAD  HEENT:  Atraumatic  CHEST/LUNG: Chest rise equal bilaterally  HEART: Regular rate and rhythm  ABDOMEN: Soft, Nontender, Nondistended  EXTREMITIES:  Extremities warm  PSYCH: A&Ox3  SKIN: No obvious rashes or lesions  MSK: No cervical spine TTP, able to range neck to the left and right/ No midline spinal TTP/ No swelling, redness, pain or discharge. No paraspinal muscle tenderness  NEUROLOGY: strength and sensation intact in all extremities. Ambulatory without difficulty.

## 2023-01-30 NOTE — ED PROVIDER NOTE - NSFOLLOWUPCLINICS_GEN_ALL_ED_FT
[facility] [place]  Neurosurgery/Spine  [address], [address]  [place] [address]  Phone: [phone]  Fax:   Follow Up Time: 1-3 Days

## 2023-01-30 NOTE — ED PROVIDER NOTE - ATTENDING CONTRIBUTION TO CARE
I have personally seen and examined this patient.  I have fully participated in the care of this patient. I performed a substantive portion of the visit including all aspects of the medical decision making. I have reviewed all pertinent clinical information, including history, physical exam, plan and the Resident’s note and agree except as noted. - MD Zachary.    28 yo F, with h/o asthma, chronic back pain, no neuro deficitis or urinary symptoms, here accompanied with her , no spine yet or dr follow up, pt denies ivdu, pain meds, spine referral.

## 2023-01-30 NOTE — ED PROVIDER NOTE - PROGRESS NOTE DETAILS
Jamal LEYVA: Pt is stable and ready for discharge. Strict return precautions given. All questions answered.

## 2023-01-31 RX ADMIN — Medication 975 MILLIGRAM(S): at 00:50

## 2023-01-31 RX ADMIN — Medication 600 MILLIGRAM(S): at 00:50

## 2023-01-31 RX ADMIN — LIDOCAINE 1 PATCH: 4 CREAM TOPICAL at 00:50

## 2023-02-01 ENCOUNTER — APPOINTMENT (OUTPATIENT)
Dept: RADIOLOGY | Facility: CLINIC | Age: 28
End: 2023-02-01
Payer: MEDICAID

## 2023-02-01 ENCOUNTER — APPOINTMENT (OUTPATIENT)
Dept: PHYSICAL MEDICINE AND REHAB | Facility: CLINIC | Age: 28
End: 2023-02-01
Payer: MEDICAID

## 2023-02-01 ENCOUNTER — OUTPATIENT (OUTPATIENT)
Dept: OUTPATIENT SERVICES | Facility: HOSPITAL | Age: 28
LOS: 1 days | End: 2023-02-01
Payer: MEDICAID

## 2023-02-01 VITALS — SYSTOLIC BLOOD PRESSURE: 121 MMHG | HEART RATE: 56 BPM | DIASTOLIC BLOOD PRESSURE: 76 MMHG | OXYGEN SATURATION: 100 %

## 2023-02-01 DIAGNOSIS — M54.6 PAIN IN THORACIC SPINE: ICD-10-CM

## 2023-02-01 DIAGNOSIS — M79.18 MYALGIA, OTHER SITE: ICD-10-CM

## 2023-02-01 DIAGNOSIS — G89.29 MYALGIA, OTHER SITE: ICD-10-CM

## 2023-02-01 DIAGNOSIS — M54.2 CERVICALGIA: ICD-10-CM

## 2023-02-01 DIAGNOSIS — G89.29 PAIN IN THORACIC SPINE: ICD-10-CM

## 2023-02-01 PROCEDURE — 72040 X-RAY EXAM NECK SPINE 2-3 VW: CPT | Mod: 26

## 2023-02-01 PROCEDURE — 72070 X-RAY EXAM THORAC SPINE 2VWS: CPT | Mod: 26

## 2023-02-01 PROCEDURE — 72070 X-RAY EXAM THORAC SPINE 2VWS: CPT

## 2023-02-01 PROCEDURE — 72040 X-RAY EXAM NECK SPINE 2-3 VW: CPT

## 2023-02-01 PROCEDURE — 99204 OFFICE O/P NEW MOD 45 MIN: CPT

## 2023-02-01 RX ORDER — LIDOCAINE 40 MG/G
4 PATCH TOPICAL
Qty: 30 | Refills: 2 | Status: ACTIVE | COMMUNITY
Start: 2023-02-01 | End: 1900-01-01

## 2023-02-01 NOTE — HISTORY OF PRESENT ILLNESS
[FreeTextEntry1] : NICOLE ROMAN is an 27 year old RHD F here for initial evaluation of neck and mid back pain. She had a fall on a slide while playing with her son on his birthday party in April 2022. She went to ED since then and was referred to outpatient pain management. 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 She had to take time off from work due to the pain.\par \par Pain location: neck and middle back\par Quality: nagging, aching\par Radiation: sometimes down the right arm\par Severity: 10/10\par Onset: April 2022 after a fall\par Associated symptoms: tingling\par Numbness: denies\par Weakness: when the pain is severe\par Exacerbated by: activity\par Improved by: rest\par Bowel or bladder involvement: rest\par \par Denies bowel/bladder dysfunction, saddle anesthesia, fevers, chills, weight loss, night pain, or night sweats at this time.\par \par The pain interferes with function, ADLs and quality of life.\par Patient had tried Acetaminophen, NSAIDs without any lasting relief of pain.\par \par Patient did not have any imaging studies to evaluate the pain.\par

## 2023-02-01 NOTE — PHYSICAL EXAM
[FreeTextEntry1] : Patient examined in presence of female chaperone: Yanet Garza\par \par General exam \par \par Constitutional: The patient appears well-developed, well-nourished, and in no apparent distress. Patient is well-groomed.  \par \par Skin: The skin is warm and dry, with normal turgor.  No rashes or lesions are noted.  \par \par Eyes: PERRL.  \par \par ENMT: Ears: Hearing is grossly within normal limits.  \par \par Neck: Supple: The neck is supple.  \par \par Respiratory: Inspection: Breathing unlabored.  \par \par Neurologic: Alert and oriented x 3. \par \par Psychiatric: Patient is cooperative and appropriate.  Mood and affect are normal.  Patient's insight is good, and memory and judgment are intact.\par \par CERVICAL/THORACIC EXAM\par \par APPEARANCE:\par No visible scars\par No gross deformity or malalignment\par No erythema, swelling or ecchymosis\par Normal temperature to touch\par Normal cervical and thoracic appearing lordosis\par No muscle atrophy of the left upper extremity\par No muscle atrophy of the right upper extremity\par No clubbing, cyanosis, swelling or erythema on the left upper extremity\par No clubbing, cyanosis, swelling or erythema on the right upper extremity\par \par TENDERNESS:\par +trigger points over bilateral trapezius and thoracic paraspinal muscles\par Absent over midline spinous processes\par +over left cervical paraspinal muscles and Trapezius \par +over right cervical paraspinal muscles and Trapezius\par \par ROM:\par Normal AROM of the cervical spine\par Normal PROM of the cervical spine\par \par \par SENSORY TESTING:\par Intact to light touch Left C3-T2\par Intact to light touch Right C3-T2\par \par MOTOR TESTING:\par Muscle tone of the left upper extremity is normal\par Muscle tone of the right upper extremity is normal\par \par Hand  strength Left 5/5\par Hand  strength Right 5/5\par \par Finger abductor strength Left 5/5\par Finger abductor strength Right 5/5\par \par Elbow flexion strength Left 5/5\par Elbow flexion strength Right 5/5\par \par Elbow extension strength Left 5/5\par Elbow extension strength Right 5/5\par \par Shoulder flexion strength Left 5/5\par Shoulder flexion strength Right 5/5\par \par Shoulder extension strength Left 5/5\par Shoulder extension strength Right 5/5\par \par Shoulder abduction strength Left 5/5\par Shoulder abduction strength Right 5/5\par \par REFLEXES:\par Moyer sign left negative\par Moyer sign right negative\par \par Biceps (C5) left 0+\par Biceps (C5) right 0+\par \par \par GAIT:\par Non-antalgic gait\par Balance normal with ambulation\par

## 2023-02-01 NOTE — ASSESSMENT
[FreeTextEntry1] : Ms. NICOLE ROMAN is a 27 year F with pain in the neck and midback. She reports chronic long standing pain and is noting an acute on chronic exacerbation of this pain due to cervical and thoracic muscle strain. There are no myelopathic signs on today's exam.\par \par Patient reassured and educated on the diagnosis and treatment options.\par \par Sending for XR cervical and thoracic spine AP/lateral to evaluate for any acute injury due to fall\par \par Rx for Lidoderm patches\par \par Sending patient for PT for cervical and thoracic muscle strain to help relieve pain and improve function. Stretching, strengthening, ROM, home education and other appropriate interventions. Precautions include fall prevention.\par \par Patient was advised to refrain from lifting more than 5lbs and to avoid activities that worsen pain at this time: extreme bending, lifting, twisting . Patient will be reevaluated during next visit and advised whether it is okay to resume normal activities. Patient expressed understanding and agreement.\par \par Follow up 6 weeks\par \par Patient was advised if the following symptoms develop: chills, fever, loss of bladder control, bowel incontinence or urinary retention, numbness/tingling or weakness is present in upper or lower extremities, to go to the nearest emergency room. This may be a new clinical condition not present at the time of the patient visit that may lead to paralysis and/or death. Patient advised if the above symptoms developed to also call the office immediately to inform us and to go to the nearest emergency room.\par \par This note was generated using Dragon medical dictation software. A reasonable effort had been made for proofreading its contents, but spelling mistakes or grammatical errors may still remain. If there are any questions or points of clarification needed please notify my office.

## 2023-07-07 NOTE — ED PROVIDER NOTE - PRO INTERPRETER NEED 2
07/07/2023  April Cespedes is a 38 y.o., female.    Patient Active Problem List   Diagnosis    Arcuate uterus    Fibroadenoma    Supervision of normal first pregnancy    Decreased fetal movement in pregnancy    Abnormal thyroid blood test    Thyroiditis    Goiter    Multinodular goiter    Uterine contractions       Past Surgical History:   Procedure Laterality Date    DILATION AND CURETTAGE OF UTERUS          Tobacco Use:  The patient  reports that she has never smoked. She does not have any smokeless tobacco history on file.     No results found for this or any previous visit.          Lab Results   Component Value Date    WBC 9.74 07/06/2023    HGB 12.3 07/06/2023    HCT 36.6 (L) 07/06/2023    MCV 88 07/06/2023     07/06/2023     BMP  Lab Results   Component Value Date     07/18/2015    K 3.6 07/18/2015     07/18/2015    CO2 27 07/18/2015    BUN 14 07/18/2015    CREATININE 0.8 07/18/2015    CALCIUM 9.1 07/18/2015    ANIONGAP 6 (L) 07/18/2015    GLU 82 07/18/2015    GLU 89 01/16/2013       No results found for this or any previous visit.            Pre-op Assessment    I have reviewed the Patient Summary Reports.     I have reviewed the Nursing Notes. I have reviewed the NPO Status.   I have reviewed the Medications.     Review of Systems  Anesthesia Hx:  No problems with previous Anesthesia  Denies Family Hx of Anesthesia complications.   Denies Personal Hx of Anesthesia complications.   Social:  Non-Smoker    Hematology/Oncology:         -- Anemia:   EENT/Dental:EENT/Dental Normal   Cardiovascular:  Cardiovascular Normal     Pulmonary:  Pulmonary Normal    Renal/:  Renal/ Normal     Hepatic/GI:  Hepatic/GI Normal    Musculoskeletal:  Musculoskeletal Normal    Neurological:  Neurology Normal    Endocrine:  Endocrine Normal    Psych:  Psychiatric Normal           Physical  English Exam  General: Well nourished    Airway:  Mallampati: II   Mouth Opening: Normal  TM Distance: Normal  Tongue: Normal  Neck ROM: Normal ROM    Dental:  Intact    Chest/Lungs:  Clear to auscultation, Normal Respiratory Rate    Heart:  Rate: Normal  Rhythm: Regular Rhythm        Anesthesia Plan  Type of Anesthesia, risks & benefits discussed:    Anesthesia Type: Epidural  Intra-op Monitoring Plan: Standard ASA Monitors  Post Op Pain Control Plan: IV/PO Opioids PRN  Informed Consent: Informed consent signed with the Patient and all parties understand the risks and agree with anesthesia plan.  All questions answered.   ASA Score: 2    Ready For Surgery From Anesthesia Perspective.     .

## 2023-08-15 NOTE — OB RN TRIAGE NOTE - TEMPERATURE IN FAHRENHEIT (DEGREES F)
99 Azithromycin Pregnancy And Lactation Text: This medication is considered safe during pregnancy and is also secreted in breast milk.

## 2023-12-19 NOTE — ED ADULT NURSE NOTE - MUSCULOSKELETAL WDL
[Follow-Up Visit] : a follow-up [Pre-Treatment Visit] : a pre-treatment [FreeTextEntry2] : relapsed EMCA Full range of motion of upper and lower extremities, no joint tenderness/swelling.
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
## 2024-03-27 NOTE — ED PROVIDER NOTE - MEDICAL DECISION MAKING DETAILS
atorvastatin (LIPITOR) 40 MG tablet 90 tablet 3 5/24/2023   Refills available thru end of May 2024/ too soon   24 yo female with normal grief reaction. no suicidal ideations. discussed with psych attending.   able to set up outpatient follow up for tomorrow at [facility] which is closer to the patient's home. will d/c.

## 2024-04-16 ENCOUNTER — APPOINTMENT (OUTPATIENT)
Dept: PEDIATRIC ALLERGY IMMUNOLOGY | Facility: CLINIC | Age: 29
End: 2024-04-16
Payer: MEDICAID

## 2024-04-16 DIAGNOSIS — Z83.6 FAMILY HISTORY OF OTHER DISEASES OF THE RESPIRATORY SYSTEM: ICD-10-CM

## 2024-04-16 DIAGNOSIS — Z82.5 FAMILY HISTORY OF ASTHMA AND OTHER CHRONIC LOWER RESPIRATORY DISEASES: ICD-10-CM

## 2024-04-16 DIAGNOSIS — J30.89 OTHER ALLERGIC RHINITIS: ICD-10-CM

## 2024-04-16 DIAGNOSIS — L30.9 DERMATITIS, UNSPECIFIED: ICD-10-CM

## 2024-04-16 PROCEDURE — 95004 PERQ TESTS W/ALRGNC XTRCS: CPT

## 2024-04-16 PROCEDURE — 99204 OFFICE O/P NEW MOD 45 MIN: CPT | Mod: 25

## 2024-04-16 RX ORDER — CETIRIZINE HYDROCHLORIDE 10 MG/1
10 TABLET, COATED ORAL
Qty: 90 | Refills: 0 | Status: ACTIVE | COMMUNITY
Start: 2024-04-16 | End: 1900-01-01

## 2024-04-16 NOTE — REASON FOR VISIT
[Initial Consultation] : an initial consultation for [Hay Fever] : hay fever [Congestion] : congestion [Runny Nose] : runny nose [Itchy Eyes] : itchy eyes [Red Eyes] : red eyes [Rash] : rash

## 2024-04-16 NOTE — IMPRESSION
[_____] : trees ([unfilled]) [Allergy Testing Mixed Feathers] : feathers [Allergy Testing Cockroach] : cockroach [Allergy Testing Dog] : dog [Allergy Testing Cat] : cat [] : molds [Allergy Testing Weeds] : weeds [Allergy Testing Grasses] : grasses [FreeTextEntry2] : Environmental allergy skin test or 2+ to DF, 1+ to oat, negative to all others.

## 2024-04-16 NOTE — ASSESSMENT
[FreeTextEntry1] : The rash on her face has occurred 4 times over the last 3 years.  Therefore it is not consistent that a food allergy would be causing it.  She has worn her make-up at other times without having a rash so therefore it will not be contact dermatitis.  I cannot give a specific etiology for the rash.  I told patient to return when the rash is present. She also has perennial allergic rhinitis.  Her allergy skin test indicated positive skin test to DF and tree pollen.  I will be putting on cetirizine 10 mg tablets once a day as needed

## 2024-04-16 NOTE — REVIEW OF SYSTEMS
[Rhinorrhea] : rhinorrhea [Nasal Congestion] : nasal congestion [Nasal Itching] : nasal itching [Sore Throat] : sore throat [Sneezing] : sneezing [Nl] : Genitourinary

## 2024-04-16 NOTE — SOCIAL HISTORY
[House] : [unfilled] lives in a house  [Radiator/Baseboard] : heating provided by radiator(s)/baseboard(s) [Window Units] : air conditioning provided by window units [Humidifier] : does not use a humidifier [Dehumidifier] : does not use a dehumidifier [Dust Mite Covers] : does not have dust mite covers [Feather Pillows] : does not have feather pillows [Feather Comforter] : does not have a feather comforter [Bedroom] : not in the bedroom [Basement] : not in the basement [Living Area] : not in the living area

## 2024-04-16 NOTE — HISTORY OF PRESENT ILLNESS
[de-identified] : Sherin comes today to the office with the following medical history and chief complaint.  In 2021 she started noticing noticing that she has a rash on her face bilaterally that occurred 1 day after eating corn and/or snapper fish.  The rash becomes very itchy it is that it is bumpy and it may last for 4 days.  If she does not do anything for it she does not use any tablets or topical medication for it.  The rash resolved on bellum over the course of 4 days.  In 2024 she had a same type of rash she begins to question whether make-up may be doing it.  Because both x 2021 2024 it was occurred the day after wearing make-up.  He notes that when she washes her face with soap that it may soap may burn that area of her skin.  She can wear make-up at other time she can wear the same make-up at other times and not have a rash in the area. She does note that she has nasal allergies in the spring which considered consist of watery eyes and nose and itchy eyes and nose.  She notes that during the summers when she goes out of the house that she has nasal allergy symptoms within the house she does not but also when he goes on in her home during the fall and winter she notes she has similar allergic symptoms.

## 2024-04-17 ENCOUNTER — EMERGENCY (EMERGENCY)
Facility: HOSPITAL | Age: 29
LOS: 1 days | Discharge: HOME | End: 2024-04-17
Admitting: STUDENT IN AN ORGANIZED HEALTH CARE EDUCATION/TRAINING PROGRAM
Payer: MEDICAID

## 2024-04-17 VITALS
OXYGEN SATURATION: 99 % | RESPIRATION RATE: 18 BRPM | TEMPERATURE: 98 F | DIASTOLIC BLOOD PRESSURE: 63 MMHG | HEART RATE: 94 BPM | SYSTOLIC BLOOD PRESSURE: 106 MMHG

## 2024-04-17 DIAGNOSIS — N75.0 CYST OF BARTHOLIN'S GLAND: Chronic | ICD-10-CM

## 2024-04-17 LAB
ALBUMIN SERPL ELPH-MCNC: 4.4 G/DL — SIGNIFICANT CHANGE UP (ref 3.3–5)
ALP SERPL-CCNC: 57 U/L — SIGNIFICANT CHANGE UP (ref 40–120)
ALT FLD-CCNC: 16 U/L — SIGNIFICANT CHANGE UP (ref 4–33)
ANION GAP SERPL CALC-SCNC: 14 MMOL/L — SIGNIFICANT CHANGE UP (ref 7–14)
APPEARANCE UR: CLEAR — SIGNIFICANT CHANGE UP
AST SERPL-CCNC: 16 U/L — SIGNIFICANT CHANGE UP (ref 4–32)
BACTERIA # UR AUTO: NEGATIVE /HPF — SIGNIFICANT CHANGE UP
BASOPHILS # BLD AUTO: 0 K/UL — SIGNIFICANT CHANGE UP (ref 0–0.2)
BASOPHILS NFR BLD AUTO: 0 % — SIGNIFICANT CHANGE UP (ref 0–2)
BILIRUB SERPL-MCNC: 0.6 MG/DL — SIGNIFICANT CHANGE UP (ref 0.2–1.2)
BILIRUB UR-MCNC: NEGATIVE — SIGNIFICANT CHANGE UP
BUN SERPL-MCNC: 8 MG/DL — SIGNIFICANT CHANGE UP (ref 7–23)
CALCIUM SERPL-MCNC: 9 MG/DL — SIGNIFICANT CHANGE UP (ref 8.4–10.5)
CAST: 0 /LPF — SIGNIFICANT CHANGE UP (ref 0–4)
CHLORIDE SERPL-SCNC: 104 MMOL/L — SIGNIFICANT CHANGE UP (ref 98–107)
CO2 SERPL-SCNC: 19 MMOL/L — LOW (ref 22–31)
COLOR SPEC: YELLOW — SIGNIFICANT CHANGE UP
CREAT SERPL-MCNC: 0.56 MG/DL — SIGNIFICANT CHANGE UP (ref 0.5–1.3)
DIFF PNL FLD: ABNORMAL
EGFR: 127 ML/MIN/1.73M2 — SIGNIFICANT CHANGE UP
EOSINOPHIL # BLD AUTO: 0.03 K/UL — SIGNIFICANT CHANGE UP (ref 0–0.5)
EOSINOPHIL NFR BLD AUTO: 1 % — SIGNIFICANT CHANGE UP (ref 0–6)
GLUCOSE SERPL-MCNC: 85 MG/DL — SIGNIFICANT CHANGE UP (ref 70–99)
GLUCOSE UR QL: NEGATIVE MG/DL — SIGNIFICANT CHANGE UP
HCG SERPL-ACNC: <1 MIU/ML — SIGNIFICANT CHANGE UP
HCT VFR BLD CALC: 38 % — SIGNIFICANT CHANGE UP (ref 34.5–45)
HGB BLD-MCNC: 12.9 G/DL — SIGNIFICANT CHANGE UP (ref 11.5–15.5)
IANC: 1.97 K/UL — SIGNIFICANT CHANGE UP (ref 1.8–7.4)
IMM GRANULOCYTES NFR BLD AUTO: 0.3 % — SIGNIFICANT CHANGE UP (ref 0–0.9)
KETONES UR-MCNC: NEGATIVE MG/DL — SIGNIFICANT CHANGE UP
LEUKOCYTE ESTERASE UR-ACNC: ABNORMAL
LIDOCAIN IGE QN: 14 U/L — SIGNIFICANT CHANGE UP (ref 7–60)
LYMPHOCYTES # BLD AUTO: 0.71 K/UL — LOW (ref 1–3.3)
LYMPHOCYTES # BLD AUTO: 23.8 % — SIGNIFICANT CHANGE UP (ref 13–44)
MAGNESIUM SERPL-MCNC: 1.7 MG/DL — SIGNIFICANT CHANGE UP (ref 1.6–2.6)
MCHC RBC-ENTMCNC: 28.5 PG — SIGNIFICANT CHANGE UP (ref 27–34)
MCHC RBC-ENTMCNC: 33.9 GM/DL — SIGNIFICANT CHANGE UP (ref 32–36)
MCV RBC AUTO: 83.9 FL — SIGNIFICANT CHANGE UP (ref 80–100)
MONOCYTES # BLD AUTO: 0.26 K/UL — SIGNIFICANT CHANGE UP (ref 0–0.9)
MONOCYTES NFR BLD AUTO: 8.7 % — SIGNIFICANT CHANGE UP (ref 2–14)
NEUTROPHILS # BLD AUTO: 1.97 K/UL — SIGNIFICANT CHANGE UP (ref 1.8–7.4)
NEUTROPHILS NFR BLD AUTO: 66.2 % — SIGNIFICANT CHANGE UP (ref 43–77)
NITRITE UR-MCNC: NEGATIVE — SIGNIFICANT CHANGE UP
NRBC # BLD: 0 /100 WBCS — SIGNIFICANT CHANGE UP (ref 0–0)
NRBC # FLD: 0 K/UL — SIGNIFICANT CHANGE UP (ref 0–0)
PH UR: 6 — SIGNIFICANT CHANGE UP (ref 5–8)
PLATELET # BLD AUTO: 224 K/UL — SIGNIFICANT CHANGE UP (ref 150–400)
POTASSIUM SERPL-MCNC: 3.2 MMOL/L — LOW (ref 3.5–5.3)
POTASSIUM SERPL-SCNC: 3.2 MMOL/L — LOW (ref 3.5–5.3)
PROT SERPL-MCNC: 7.8 G/DL — SIGNIFICANT CHANGE UP (ref 6–8.3)
PROT UR-MCNC: 30 MG/DL
RBC # BLD: 4.53 M/UL — SIGNIFICANT CHANGE UP (ref 3.8–5.2)
RBC # FLD: 12 % — SIGNIFICANT CHANGE UP (ref 10.3–14.5)
RBC CASTS # UR COMP ASSIST: 119 /HPF — HIGH (ref 0–4)
REVIEW: SIGNIFICANT CHANGE UP
SODIUM SERPL-SCNC: 137 MMOL/L — SIGNIFICANT CHANGE UP (ref 135–145)
SP GR SPEC: 1.02 — SIGNIFICANT CHANGE UP (ref 1–1.03)
SQUAMOUS # UR AUTO: 4 /HPF — SIGNIFICANT CHANGE UP (ref 0–5)
UROBILINOGEN FLD QL: 1 MG/DL — SIGNIFICANT CHANGE UP (ref 0.2–1)
WBC # BLD: 2.98 K/UL — LOW (ref 3.8–10.5)
WBC # FLD AUTO: 2.98 K/UL — LOW (ref 3.8–10.5)
WBC UR QL: 2 /HPF — SIGNIFICANT CHANGE UP (ref 0–5)

## 2024-04-17 PROCEDURE — 76705 ECHO EXAM OF ABDOMEN: CPT | Mod: 26

## 2024-04-17 PROCEDURE — 99284 EMERGENCY DEPT VISIT MOD MDM: CPT

## 2024-04-17 RX ORDER — SODIUM CHLORIDE 9 MG/ML
1000 INJECTION INTRAMUSCULAR; INTRAVENOUS; SUBCUTANEOUS ONCE
Refills: 0 | Status: COMPLETED | OUTPATIENT
Start: 2024-04-17 | End: 2024-04-17

## 2024-04-17 RX ORDER — METOCLOPRAMIDE HCL 10 MG
10 TABLET ORAL ONCE
Refills: 0 | Status: COMPLETED | OUTPATIENT
Start: 2024-04-17 | End: 2024-04-17

## 2024-04-17 RX ORDER — FAMOTIDINE 10 MG/ML
20 INJECTION INTRAVENOUS ONCE
Refills: 0 | Status: COMPLETED | OUTPATIENT
Start: 2024-04-17 | End: 2024-04-17

## 2024-04-17 RX ADMIN — SODIUM CHLORIDE 1000 MILLILITER(S): 9 INJECTION INTRAMUSCULAR; INTRAVENOUS; SUBCUTANEOUS at 22:06

## 2024-04-17 RX ADMIN — FAMOTIDINE 20 MILLIGRAM(S): 10 INJECTION INTRAVENOUS at 22:06

## 2024-04-17 RX ADMIN — Medication 10 MILLIGRAM(S): at 22:06

## 2024-04-17 NOTE — ED PROVIDER NOTE - OBJECTIVE STATEMENT
Patient is a 28-year-old female with past medical history of H. pylori (completed treatment) who presented to ED with 1 day of abdominal pain, nausea, vomiting, diarrhea.  Patient reports she woke up around 2 in the morning with abdominal pain and has since had a few episodes of nonbloody diarrhea.  Patient also reports 3 episodes of vomiting and worsening pain after eating as well as dysuria that started today.  Patient denies prior abdominal surgeries.  Patient denies fevers, chills, chest pain, palpitations, shortness of breath.

## 2024-04-17 NOTE — ED PROVIDER NOTE - CLINICAL SUMMARY MEDICAL DECISION MAKING FREE TEXT BOX
Patient is a 28-year-old female with past medical history of H. pylori (completed treatment) who presented to ED with 1 day of abdominal pain, nausea, vomiting, diarrhea, dysuria x 1 day. Pt with tenderness to RUQ on exam. Plan for labs, IV fluids, GI medications, abdominal US, UA and will reassess.

## 2024-04-17 NOTE — ED PROVIDER NOTE - PROGRESS NOTE DETAILS
ELODIA Rodriguez: pt reassessed, reports pain has improved, now eating at bedside  pt with low K on bloodwork, will replete orally  UA neg for infection  Abdominal US neg for GB pathology  Most likely viral gastro, will d/c with strict return precautions. Pt aware and in agreement with plan.

## 2024-04-17 NOTE — ED ADULT NURSE NOTE - OBJECTIVE STATEMENT
Patient received in intake 9, A&Ox3 ambulatory at baseline pmh: H. Pylori presenting to ED with abdominal pain, n/v/d. Pt states she woke up at 2AM with abdominal pain and multiple episodes of nonbloody vomit and diarrhea. Pt states she had 3 episodes vomiting and over ten episodes of diarrhea. +dysuria. Denies CP, SOB, fever chills, visual changes, hematuria. Right 20g placed, labs drawn and sent. Pending US

## 2024-04-17 NOTE — ED PROVIDER NOTE - NSFOLLOWUPINSTRUCTIONS_ED_ALL_ED_FT
Gastroenteritis    AMBULATORY CARE:    Gastroenteritis, or stomach flu, is an infection of the stomach and intestines. It is caused by bacteria, parasites, or viruses.  Digestive Tract    Common symptoms include the following:    Diarrhea or gas    Nausea, vomiting, or poor appetite    Abdominal cramps, pain, or gurgling    Fever    Tiredness or weakness    Headaches or muscle aches with any of the above symptoms  Call 911 for any of the following:    You have trouble breathing or a very fast pulse.    Seek care immediately if:    You see blood in your diarrhea.    You cannot stop vomiting.    You have not urinated for 12 hours.    You feel like you are going to faint.  Contact your healthcare provider if:    You have a fever.    You continue to vomit or have diarrhea, even after treatment.    You see worms in your diarrhea.    Your mouth or eyes are dry. You are not urinating as much or as often.    You have questions or concerns about your condition or care.  Treatment for gastroenteritis may include medicines to slow or stop your diarrhea or vomiting. You may also need medicines to treat an infection caused by bacteria or a parasite.    Manage your symptoms:    Drink liquids as directed. Ask your healthcare provider how much liquid to drink each day, and which liquids are best for you. You may also need to drink an oral rehydration solution (ORS). An ORS has the right amounts of sugar, salt, and minerals in water to replace body fluids.    Eat bland foods. When you feel hungry, begin eating soft, bland foods. Examples are bananas, clear soup, potatoes, and applesauce. Do not have dairy products, alcohol, sugary drinks, or drinks with caffeine until you feel better.    Rest as much as possible. Slowly start to do more each day when you begin to feel better.  Prevent the spread of germs: Gastroenteritis can spread easily. Keep yourself, your family, and your surroundings clean to help prevent the spread of gastroenteritis:    Wash your hands often. Use soap and water. Wash your hands after you use the bathroom, change a child's diapers, or sneeze. Wash your hands before you prepare or eat food.  Handwashing      Clean surfaces and do laundry often. Wash your clothes and towels separately from the rest of the laundry. Clean surfaces in your home with antibacterial  or bleach.    Clean food thoroughly and cook safely. Wash raw vegetables before you cook. Cook meat, fish, and eggs fully. Do not use the same dishes for raw meat as you do for other foods. Refrigerate any leftover food immediately.    Be aware when you camp or travel. Drink only clean water. Do not drink from rivers or lakes unless you purify or boil the water first. When you travel, drink bottled water and do not add ice. Do not eat fruit that has not been peeled. Do not eat raw fish or meat that is not fully cooked.

## 2024-04-17 NOTE — ED ADULT TRIAGE NOTE - CHIEF COMPLAINT QUOTE
Pt AOX4 c/o abdominal pain, diarrhea and vomiting, started at 0200 today, also feeling some new onset burning on urination; Hx of h. Pylori, had treatment in January 2024

## 2024-04-17 NOTE — ED PROVIDER NOTE - PATIENT PORTAL LINK FT
You can access the FollowMyHealth Patient Portal offered by [facility] by registering at the following website: [email or web address]. By joining [facility]’s FollowMyHealth portal, you will also be able to view your health information using other applications (apps) compatible with our system.

## 2024-04-18 VITALS
RESPIRATION RATE: 18 BRPM | OXYGEN SATURATION: 100 % | TEMPERATURE: 100 F | HEART RATE: 66 BPM | SYSTOLIC BLOOD PRESSURE: 108 MMHG | DIASTOLIC BLOOD PRESSURE: 64 MMHG

## 2024-04-18 RX ORDER — POTASSIUM CHLORIDE 20 MEQ
40 PACKET (EA) ORAL ONCE
Refills: 0 | Status: COMPLETED | OUTPATIENT
Start: 2024-04-18 | End: 2024-04-18

## 2024-04-18 RX ADMIN — Medication 40 MILLIEQUIVALENT(S): at 00:20

## 2024-04-19 LAB
CULTURE RESULTS: SIGNIFICANT CHANGE UP
SPECIMEN SOURCE: SIGNIFICANT CHANGE UP

## 2024-04-27 ENCOUNTER — APPOINTMENT (OUTPATIENT)
Dept: AFTER HOURS CARE | Facility: EMERGENCY ROOM | Age: 29
End: 2024-04-27

## 2024-05-05 ENCOUNTER — NON-APPOINTMENT (OUTPATIENT)
Age: 29
End: 2024-05-05

## 2024-05-07 ENCOUNTER — NON-APPOINTMENT (OUTPATIENT)
Age: 29
End: 2024-05-07

## 2024-06-28 NOTE — OB RN DELIVERY SUMMARY - BABY A: APGAR 5 MIN REFLEX IRRITABILITY, DELIVERY
Informed patient's mother staff waiting on updates from dr desir regarding zoladex injections.       ----- Message from Fay Cook sent at 6/28/2024 12:42 PM CDT -----  Regarding: Consult/Advisory  Contact: Dov Hernandez     Consult/Advisory     Name Of Caller:Dov Hernandez         Contact Preference:[phone] (home)       Nature of call:Patient is calling to check on states of injections. Requesting a call back  
(2) cough or sneeze

## 2024-08-06 ENCOUNTER — APPOINTMENT (OUTPATIENT)
Dept: PEDIATRIC ALLERGY IMMUNOLOGY | Facility: CLINIC | Age: 29
End: 2024-08-06

## 2024-09-07 NOTE — OB RN TRIAGE NOTE - NS_SOURCEOFINFO_OBGYN_ALL_OB
[Dear  ___] : Dear  [unfilled], [Courtesy Letter:] : I had the pleasure of seeing your patient, [unfilled], in my office today. [Please see my note below.] : Please see my note below. [Consult Closing:] : Thank you very much for allowing me to participate in the care of this patient.  If you have any questions, please do not hesitate to contact me. [Sincerely,] : Sincerely, [FreeTextEntry3] : Amanda John MD Pediatric Endocrinology [facility] Patient

## 2024-09-23 ENCOUNTER — NON-APPOINTMENT (OUTPATIENT)
Age: 29
End: 2024-09-23

## 2024-09-24 ENCOUNTER — APPOINTMENT (OUTPATIENT)
Dept: ORTHOPEDIC SURGERY | Facility: CLINIC | Age: 29
End: 2024-09-24
Payer: MEDICAID

## 2024-09-24 VITALS — BODY MASS INDEX: 28.66 KG/M2 | HEIGHT: 65 IN | WEIGHT: 172 LBS

## 2024-09-24 DIAGNOSIS — G56.03 CARPAL TUNNEL SYNDROM,BILATERAL UPPER LIMBS: ICD-10-CM

## 2024-09-24 PROCEDURE — 99203 OFFICE O/P NEW LOW 30 MIN: CPT

## 2024-10-01 ENCOUNTER — NON-APPOINTMENT (OUTPATIENT)
Age: 29
End: 2024-10-01

## 2024-10-14 ENCOUNTER — APPOINTMENT (OUTPATIENT)
Dept: PEDIATRIC MEDICAL GENETICS | Facility: CLINIC | Age: 29
End: 2024-10-14
Payer: MEDICAID

## 2024-10-14 VITALS — BODY MASS INDEX: 28.16 KG/M2 | HEIGHT: 65 IN | WEIGHT: 169.01 LBS

## 2024-10-14 DIAGNOSIS — Z91.89 OTHER SPECIFIED PERSONAL RISK FACTORS, NOT ELSEWHERE CLASSIFIED: ICD-10-CM

## 2024-10-14 PROCEDURE — 99204 OFFICE O/P NEW MOD 45 MIN: CPT

## 2024-10-28 NOTE — OB PROVIDER TRIAGE NOTE - NS_GBS_INFANT_INVASIVE_OBGYN_ALL_OB_FT
Quality 134: Screening For Clinical Depression And Follow-Up Plan: Depression Screening not documented, reason not given
Quality 226: Preventive Care And Screening: Tobacco Use: Screening And Cessation Intervention: Tobacco Screening not Performed
Quality 431: Preventive Care And Screening: Unhealthy Alcohol Use - Screening: Patient not identified as an unhealthy alcohol user when screened for unhealthy alcohol use using a systematic screening method
Detail Level: Detailed
Patient states no history

## 2024-11-26 ENCOUNTER — NON-APPOINTMENT (OUTPATIENT)
Age: 29
End: 2024-11-26

## 2024-11-27 ENCOUNTER — OUTPATIENT (OUTPATIENT)
Dept: OUTPATIENT SERVICES | Facility: HOSPITAL | Age: 29
LOS: 1 days | End: 2024-11-27

## 2024-11-27 VITALS — HEIGHT: 65 IN | WEIGHT: 167.99 LBS

## 2024-11-27 DIAGNOSIS — G56.03 CARPAL TUNNEL SYNDROME, BILATERAL UPPER LIMBS: ICD-10-CM

## 2024-11-27 DIAGNOSIS — G56.01 CARPAL TUNNEL SYNDROME, RIGHT UPPER LIMB: ICD-10-CM

## 2024-11-27 DIAGNOSIS — N75.0 CYST OF BARTHOLIN'S GLAND: Chronic | ICD-10-CM

## 2024-11-27 NOTE — H&P PST ADULT - HISTORY OF PRESENT ILLNESS
30 yo female c/o pain and numbness  to bilateral wrist, paresthesia to right fingers.  Pt was dx with bilateral carpal syndrome.  Pt now present in Presurgical testing for preop evaluation for scheduled procedure 1st right carpal tunnel release

## 2024-11-27 NOTE — H&P PST ADULT - MUSCULOSKELETAL
details… normal/ROM intact/no joint erythema/no joint warmth/no calf tenderness/normal gait/strength 5/5 bilateral upper extremities/strength 5/5 bilateral lower extremities/back exam

## 2024-11-27 NOTE — H&P PST ADULT - PROBLEM SELECTOR PLAN 1
Scheduled procedure 1st right carpal tunnel release  Pre op and Hibiclens instructions given and explained.  Avoid NSAIDs and OTC supplements.   Patient verbalized understanding

## 2024-11-27 NOTE — H&P PST ADULT - PSYCHIATRIC COMMENTS
hx anxiety and depression was prescribed medication not taking any at this time pt is currently try to get another therapist

## 2024-11-27 NOTE — H&P PST ADULT - OPHTHALMOLOGIC COMMENTS
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

## 2024-11-27 NOTE — H&P PST ADULT - NSICDXPASTMEDICALHX_GEN_ALL_CORE_FT
PAST MEDICAL HISTORY:  Anxiety and depression     Asthma Last attack last year    Bulging lumbar disc     Herpes      (normal spontaneous vaginal delivery) FT 7/15/16  Fetal demise @21 wks 18    Pinched nerve in neck

## 2024-11-27 NOTE — H&P PST ADULT - MUSCULOSKELETAL COMMENTS
bilateral carpal tunnel syndrome, pain to bilateral wrist radiating to finger tips, Carpal tunnel syndrome B/L

## 2024-11-27 NOTE — H&P PST ADULT - NSICDXFAMILYHX_GEN_ALL_CORE_FT
FAMILY HISTORY:  Family history of arthritis, unclear which kind pt unsure  FH: hypertension  FH: hypertension

## 2024-11-27 NOTE — H&P PST ADULT - NSANTHOSAYNRD_GEN_A_CORE
Pt referred to sleep study test/No. GEETA screening performed.  STOP BANG Legend: 0-2 = LOW Risk; 3-4 = INTERMEDIATE Risk; 5-8 = HIGH Risk

## 2024-12-04 VITALS
RESPIRATION RATE: 16 BRPM | WEIGHT: 167.99 LBS | SYSTOLIC BLOOD PRESSURE: 109 MMHG | DIASTOLIC BLOOD PRESSURE: 60 MMHG | OXYGEN SATURATION: 100 % | HEIGHT: 65 IN | TEMPERATURE: 98 F | HEART RATE: 53 BPM

## 2024-12-05 ENCOUNTER — TRANSCRIPTION ENCOUNTER (OUTPATIENT)
Age: 29
End: 2024-12-05

## 2024-12-05 ENCOUNTER — OUTPATIENT (OUTPATIENT)
Dept: OUTPATIENT SERVICES | Facility: HOSPITAL | Age: 29
LOS: 1 days | Discharge: HOME | End: 2024-12-05
Payer: MEDICAID

## 2024-12-05 ENCOUNTER — APPOINTMENT (OUTPATIENT)
Dept: ORTHOPEDIC SURGERY | Facility: AMBULATORY SURGERY CENTER | Age: 29
End: 2024-12-05

## 2024-12-05 VITALS
OXYGEN SATURATION: 100 % | RESPIRATION RATE: 18 BRPM | SYSTOLIC BLOOD PRESSURE: 106 MMHG | DIASTOLIC BLOOD PRESSURE: 70 MMHG | HEART RATE: 60 BPM

## 2024-12-05 DIAGNOSIS — G56.03 CARPAL TUNNEL SYNDROME, BILATERAL UPPER LIMBS: ICD-10-CM

## 2024-12-05 DIAGNOSIS — N75.0 CYST OF BARTHOLIN'S GLAND: Chronic | ICD-10-CM

## 2024-12-05 PROCEDURE — 64721 CARPAL TUNNEL SURGERY: CPT | Mod: RT

## 2024-12-05 RX ORDER — PREGABALIN 75 MG/1
1 CAPSULE ORAL
Refills: 0 | DISCHARGE

## 2024-12-05 NOTE — ASU DISCHARGE PLAN (ADULT/PEDIATRIC) - CARE PROVIDER_API CALL
Bowen Valenzuela  Sur[facility]  [address], [address]  [place] [address]  Phone: [phone]  Fax: [phone]  Follow Up Time: 1 week

## 2024-12-05 NOTE — ASU PREOP CHECKLIST - NOTHING BY MOUTH SINCE
Normal vision: sees adequately in most situations; can see medication labels, newsprint 04-Dec-2024 21:00

## 2024-12-05 NOTE — ASU DISCHARGE PLAN (ADULT/PEDIATRIC) - ASU DC SPECIAL INSTRUCTIONSFT
Please see handout from Dr. Valenzuela for specific instructions including follow up. If you are supposed to take any specific prescription medications postoperatively, they have already been sent to your pharmacy.

## 2024-12-05 NOTE — ASU DISCHARGE PLAN (ADULT/PEDIATRIC) - FINANCIAL ASSISTANCE
[facility] provides services at a reduced cost to those who are determined to be eligible through [facility]’s financial assistance program. Information regarding [facility]’s financial assistance program can be found by going to [email or web address] or by calling [phone].

## 2024-12-05 NOTE — ASU DISCHARGE PLAN (ADULT/PEDIATRIC) - CALL YOUR DOCTOR IF YOU HAVE ANY OF THE FOLLOWING:
Bleeding that does not stop/Swelling that gets worse/Pain not relieved by Medications/Wound/Surgical Site with redness, or foul smelling discharge or pus/Numbness, tingling, color or temperature change to extremity/Unable to urinate

## 2024-12-16 ENCOUNTER — NON-APPOINTMENT (OUTPATIENT)
Age: 29
End: 2024-12-16

## 2024-12-20 ENCOUNTER — APPOINTMENT (OUTPATIENT)
Dept: ORTHOPEDIC SURGERY | Facility: CLINIC | Age: 29
End: 2024-12-20

## 2024-12-23 PROBLEM — G58.9 MONONEUROPATHY, UNSPECIFIED: Chronic | Status: ACTIVE | Noted: 2024-11-27

## 2024-12-23 PROBLEM — M51.369 OTHER INTERVERTEBRAL DISC DEGENERATION, LUMBAR REGION WITHOUT MENTION OF LUMBAR BACK PAIN OR LOWER EXTREMITY PAIN: Chronic | Status: ACTIVE | Noted: 2024-11-27

## 2024-12-30 ENCOUNTER — APPOINTMENT (OUTPATIENT)
Dept: ORTHOPEDIC SURGERY | Facility: CLINIC | Age: 29
End: 2024-12-30
Payer: MEDICAID

## 2024-12-30 DIAGNOSIS — G56.03 CARPAL TUNNEL SYNDROM,BILATERAL UPPER LIMBS: ICD-10-CM

## 2024-12-30 PROCEDURE — 99024 POSTOP FOLLOW-UP VISIT: CPT

## 2025-01-16 ENCOUNTER — APPOINTMENT (OUTPATIENT)
Dept: ORTHOPEDIC SURGERY | Facility: CLINIC | Age: 30
End: 2025-01-16

## 2025-01-27 ENCOUNTER — APPOINTMENT (OUTPATIENT)
Dept: ORTHOPEDIC SURGERY | Facility: CLINIC | Age: 30
End: 2025-01-27

## 2025-02-10 NOTE — PATIENT PROFILE OB - BRADEN SCORE (IF 18 OR LESS ACTIVATE SKIN INJURY RISK INCREASED GUIDELINE), MLM
Body Location Override (Optional - Billing Will Still Be Based On Selected Body Map Location If Applicable): mid low back
Detail Level: Detailed
Lesion Type: Abscess
Method: 4 mm punch
Curette: No
Anesthesia Type: 1% lidocaine with 1:200,000 epinephrine and a 1:10 solution of 8.4% sodium bicarbonate
Anesthesia Volume In Cc: 2.5
Size Of Lesion In Cm (Optional But May Be Required For Some Insurances): 1.5
Drainage Amount?: moderate
Drainage Type?: keratinaceous
Wound Care: Mupirocin
Dressing: pressure dressing
Epidermal Sutures: 4-0 Ethilon
Epidermal Closure: simple interrupted
Suture Text: The incision was partially closed with
Preparation Text: The area was prepped in the usual clean fashion.
Curette Text (Optional): After the contents were expressed a curette was used to partially remove the cyst wall.
Medical Necessity Clause: The procedure was medically necessary due to one or more of the following: infection, severe pain, erythema, and warmth. These symptoms are either too severe to respond to conservative measures or have failed conservative measures, and, therefore, procedural intervention is medically indicated
Consent was obtained and risks were reviewed including but not limited to delayed wound healing, infection, need for multiple I and D's, and pain.
Post-Care Instructions: I reviewed with the patient in detail post-care instructions. Patient should keep wound covered and call the office should any redness, pain, swelling or worsening occur.
22

## 2025-02-18 ENCOUNTER — NON-APPOINTMENT (OUTPATIENT)
Age: 30
End: 2025-02-18

## 2025-02-21 ENCOUNTER — APPOINTMENT (OUTPATIENT)
Dept: ORTHOPEDIC SURGERY | Facility: CLINIC | Age: 30
End: 2025-02-21
Payer: MEDICAID

## 2025-02-21 DIAGNOSIS — G56.03 CARPAL TUNNEL SYNDROM,BILATERAL UPPER LIMBS: ICD-10-CM

## 2025-02-21 PROCEDURE — 99024 POSTOP FOLLOW-UP VISIT: CPT

## 2025-03-13 NOTE — ED PROVIDER NOTE - CPE EDP CARDIAC NORM
Outreach attempt was made to schedule a Medicare Wellness Visit. This was the fist attempt to schedule appt spoke to pt stated did not want to schedule for September at this time will call in August to schedule.      normal...

## 2025-03-27 ENCOUNTER — APPOINTMENT (OUTPATIENT)
Dept: ORTHOPEDIC SURGERY | Facility: CLINIC | Age: 30
End: 2025-03-27

## 2025-06-13 NOTE — OB PROVIDER H&P - NS_PELVICEXAM_OBGYN_ALL_OB
Patient is requesting a testosterone level evaluation due to intimacy concerns and decreased libido.    Please review.   Yes

## 2025-07-28 ENCOUNTER — NON-APPOINTMENT (OUTPATIENT)
Age: 30
End: 2025-07-28

## (undated) DEVICE — VENODYNE/SCD SLEEVE CALF MEDIUM

## (undated) DEVICE — SUT ETHILON 4-0 18" PS-2

## (undated) DEVICE — PACK HAND

## (undated) DEVICE — GLV 7 PROTEXIS (WHITE)

## (undated) DEVICE — SUT MONOCRYL 5-0 18" P-3 UNDYED

## (undated) DEVICE — DRSG STERISTRIPS 0.5 X 4"

## (undated) DEVICE — BIPOLAR FORCEP KIRWAN JEWELERS STR 4" X 0.4MM W 12FT CORD (GREEN)

## (undated) DEVICE — SOL IRR POUR NS 0.9% 500ML

## (undated) DEVICE — WARMING BLANKET LOWER ADULT

## (undated) DEVICE — DRSG DERMABOND 0.7ML

## (undated) DEVICE — PREP CHLORAPREP HI-LITE ORANGE 26ML

## (undated) DEVICE — DRSG COBAN 2" LF STERILE

## (undated) DEVICE — TOURNIQUET CUFF 18" DUAL PORT SINGLE BLADDER LUER LOCK (BLACK)